# Patient Record
Sex: FEMALE | Race: BLACK OR AFRICAN AMERICAN | NOT HISPANIC OR LATINO
[De-identification: names, ages, dates, MRNs, and addresses within clinical notes are randomized per-mention and may not be internally consistent; named-entity substitution may affect disease eponyms.]

---

## 2017-03-09 ENCOUNTER — APPOINTMENT (OUTPATIENT)
Dept: OTOLARYNGOLOGY | Facility: CLINIC | Age: 67
End: 2017-03-09

## 2017-03-09 VITALS
BODY MASS INDEX: 27 KG/M2 | SYSTOLIC BLOOD PRESSURE: 97 MMHG | DIASTOLIC BLOOD PRESSURE: 65 MMHG | HEIGHT: 61 IN | WEIGHT: 143 LBS | HEART RATE: 52 BPM

## 2017-04-04 ENCOUNTER — APPOINTMENT (OUTPATIENT)
Dept: OPHTHALMOLOGY | Facility: CLINIC | Age: 67
End: 2017-04-04

## 2017-05-27 ENCOUNTER — INPATIENT (INPATIENT)
Facility: HOSPITAL | Age: 67
LOS: 3 days | Discharge: HOME | End: 2017-05-31
Attending: INTERNAL MEDICINE | Admitting: INTERNAL MEDICINE
Payer: MEDICARE

## 2017-05-27 VITALS
RESPIRATION RATE: 20 BRPM | HEART RATE: 124 BPM | DIASTOLIC BLOOD PRESSURE: 81 MMHG | TEMPERATURE: 99 F | SYSTOLIC BLOOD PRESSURE: 121 MMHG | OXYGEN SATURATION: 100 %

## 2017-05-27 LAB
ALBUMIN SERPL ELPH-MCNC: 4 G/DL — SIGNIFICANT CHANGE UP (ref 3.3–5)
ALP SERPL-CCNC: 37 U/L — LOW (ref 40–120)
ALT FLD-CCNC: 16 U/L — SIGNIFICANT CHANGE UP (ref 4–33)
AST SERPL-CCNC: 20 U/L — SIGNIFICANT CHANGE UP (ref 4–32)
BASOPHILS # BLD AUTO: 0.01 K/UL — SIGNIFICANT CHANGE UP (ref 0–0.2)
BASOPHILS NFR BLD AUTO: 0.1 % — SIGNIFICANT CHANGE UP (ref 0–2)
BILIRUB SERPL-MCNC: < 0.2 MG/DL — LOW (ref 0.2–1.2)
BUN SERPL-MCNC: 18 MG/DL — SIGNIFICANT CHANGE UP (ref 7–23)
CALCIUM SERPL-MCNC: 9.6 MG/DL — SIGNIFICANT CHANGE UP (ref 8.4–10.5)
CHLORIDE SERPL-SCNC: 108 MMOL/L — HIGH (ref 98–107)
CK MB BLD-MCNC: 3.61 NG/ML — SIGNIFICANT CHANGE UP (ref 1–4.7)
CK SERPL-CCNC: 255 U/L — HIGH (ref 25–170)
CO2 SERPL-SCNC: 27 MMOL/L — SIGNIFICANT CHANGE UP (ref 22–31)
CREAT SERPL-MCNC: 0.83 MG/DL — SIGNIFICANT CHANGE UP (ref 0.5–1.3)
EOSINOPHIL # BLD AUTO: 0 K/UL — SIGNIFICANT CHANGE UP (ref 0–0.5)
EOSINOPHIL NFR BLD AUTO: 0 % — SIGNIFICANT CHANGE UP (ref 0–6)
GLUCOSE SERPL-MCNC: 123 MG/DL — HIGH (ref 70–99)
HCT VFR BLD CALC: 35.4 % — SIGNIFICANT CHANGE UP (ref 34.5–45)
HGB BLD-MCNC: 11.6 G/DL — SIGNIFICANT CHANGE UP (ref 11.5–15.5)
IMM GRANULOCYTES NFR BLD AUTO: 1.1 % — SIGNIFICANT CHANGE UP (ref 0–1.5)
LYMPHOCYTES # BLD AUTO: 1.62 K/UL — SIGNIFICANT CHANGE UP (ref 1–3.3)
LYMPHOCYTES # BLD AUTO: 17.1 % — SIGNIFICANT CHANGE UP (ref 13–44)
MCHC RBC-ENTMCNC: 24.4 PG — LOW (ref 27–34)
MCHC RBC-ENTMCNC: 32.8 % — SIGNIFICANT CHANGE UP (ref 32–36)
MCV RBC AUTO: 74.4 FL — LOW (ref 80–100)
MONOCYTES # BLD AUTO: 0.54 K/UL — SIGNIFICANT CHANGE UP (ref 0–0.9)
MONOCYTES NFR BLD AUTO: 5.7 % — SIGNIFICANT CHANGE UP (ref 2–14)
NEUTROPHILS # BLD AUTO: 7.21 K/UL — SIGNIFICANT CHANGE UP (ref 1.8–7.4)
NEUTROPHILS NFR BLD AUTO: 76 % — SIGNIFICANT CHANGE UP (ref 43–77)
PLATELET # BLD AUTO: 176 K/UL — SIGNIFICANT CHANGE UP (ref 150–400)
PMV BLD: 12.3 FL — SIGNIFICANT CHANGE UP (ref 7–13)
POTASSIUM SERPL-MCNC: 4.5 MMOL/L — SIGNIFICANT CHANGE UP (ref 3.5–5.3)
POTASSIUM SERPL-SCNC: 4.5 MMOL/L — SIGNIFICANT CHANGE UP (ref 3.5–5.3)
PROT SERPL-MCNC: 7.4 G/DL — SIGNIFICANT CHANGE UP (ref 6–8.3)
RBC # BLD: 4.76 M/UL — SIGNIFICANT CHANGE UP (ref 3.8–5.2)
RBC # FLD: 15.8 % — HIGH (ref 10.3–14.5)
SODIUM SERPL-SCNC: 146 MMOL/L — HIGH (ref 135–145)
TROPONIN T SERPL-MCNC: < 0.06 NG/ML — SIGNIFICANT CHANGE UP (ref 0–0.06)
TSH SERPL-MCNC: 2.29 UIU/ML — SIGNIFICANT CHANGE UP (ref 0.27–4.2)
WBC # BLD: 9.48 K/UL — SIGNIFICANT CHANGE UP (ref 3.8–10.5)
WBC # FLD AUTO: 9.48 K/UL — SIGNIFICANT CHANGE UP (ref 3.8–10.5)

## 2017-05-27 PROCEDURE — 71010: CPT | Mod: 26

## 2017-05-27 RX ORDER — SODIUM CHLORIDE 9 MG/ML
1000 INJECTION INTRAMUSCULAR; INTRAVENOUS; SUBCUTANEOUS ONCE
Qty: 0 | Refills: 0 | Status: COMPLETED | OUTPATIENT
Start: 2017-05-27 | End: 2017-05-27

## 2017-05-27 RX ADMIN — SODIUM CHLORIDE 1000 MILLILITER(S): 9 INJECTION INTRAMUSCULAR; INTRAVENOUS; SUBCUTANEOUS at 21:09

## 2017-05-27 NOTE — ED ADULT NURSE NOTE - OBJECTIVE STATEMENT
Olga RN: Pt received on stretcher in room 3 with visitor at bedside. Pt is awake, ambulatory, A&Ox4, NAD observed, respirations even and unlabored on room air. Pt is 68 YO female with no significant PMH c/o chest pain. Pt states that she had sudden onset of chest pain at 1700h after walking up six flights of stairs, described as "throbbing", located in center of chest, does not radiate, intermittent with palpitations feeling like heart is racing. Denies SOB, HA, any other symptoms. Placed on CM, . SpO2 100% on RA. 20G IV access obtained in left AC, labs drawn and sent as ordered. Comfort and safety measures provided. Report given to primary RN.

## 2017-05-27 NOTE — ED PROVIDER NOTE - OBJECTIVE STATEMENT
67F with borderline DM p/w CP and palpitations since 1700. Reports nonexertional onset, has never had this before. Denies SOB, vomiting, fever, drug use, EtOH use, abd pain, leg swelling.

## 2017-05-27 NOTE — ED PROVIDER NOTE - MEDICAL DECISION MAKING DETAILS
Pt with new onset afib with rvr, symptomatic with CP and palpitations. BP wnl. Will give IVF, cardizem, labs, cxr, reassess, likely admit

## 2017-05-27 NOTE — ED PROVIDER NOTE - ATTENDING CONTRIBUTION TO CARE
I performed a face-to-face evaluation of the patient and performed a history and physical examination. I agree with the history and physical examination.    67-year-old woman with history of palpitations who has a cardiologist Dr. Kishore Colunga presents palpitations without chest pain or shortness of breath. Found to be in atrial fib, which is new-onset. Appears well. Afebrile. Vital signs stable. No lower extremity edema. Respirations unlabored. Will discuss with Dr. Colunga regarding initiation of anticoagulation, as her [id number] score equals two. Evaluate for thyroid disease and MI.

## 2017-05-28 DIAGNOSIS — Z29.9 ENCOUNTER FOR PROPHYLACTIC MEASURES, UNSPECIFIED: ICD-10-CM

## 2017-05-28 DIAGNOSIS — I48.91 UNSPECIFIED ATRIAL FIBRILLATION: ICD-10-CM

## 2017-05-28 DIAGNOSIS — R73.03 PREDIABETES: ICD-10-CM

## 2017-05-28 DIAGNOSIS — Z90.710 ACQUIRED ABSENCE OF BOTH CERVIX AND UTERUS: Chronic | ICD-10-CM

## 2017-05-28 LAB
ALBUMIN SERPL ELPH-MCNC: 3.6 G/DL — SIGNIFICANT CHANGE UP (ref 3.3–5)
ALP SERPL-CCNC: 33 U/L — LOW (ref 40–120)
ALT FLD-CCNC: 17 U/L — SIGNIFICANT CHANGE UP (ref 4–33)
AST SERPL-CCNC: 17 U/L — SIGNIFICANT CHANGE UP (ref 4–32)
BILIRUB SERPL-MCNC: < 0.2 MG/DL — LOW (ref 0.2–1.2)
BUN SERPL-MCNC: 12 MG/DL — SIGNIFICANT CHANGE UP (ref 7–23)
CALCIUM SERPL-MCNC: 8.7 MG/DL — SIGNIFICANT CHANGE UP (ref 8.4–10.5)
CHLORIDE SERPL-SCNC: 109 MMOL/L — HIGH (ref 98–107)
CHOLEST SERPL-MCNC: 172 MG/DL — SIGNIFICANT CHANGE UP (ref 120–199)
CK MB BLD-MCNC: 1.5 — SIGNIFICANT CHANGE UP (ref 0–2.5)
CK MB BLD-MCNC: 3.1 NG/ML — SIGNIFICANT CHANGE UP (ref 1–4.7)
CK SERPL-CCNC: 203 U/L — HIGH (ref 25–170)
CO2 SERPL-SCNC: 26 MMOL/L — SIGNIFICANT CHANGE UP (ref 22–31)
CREAT SERPL-MCNC: 0.65 MG/DL — SIGNIFICANT CHANGE UP (ref 0.5–1.3)
GLUCOSE SERPL-MCNC: 115 MG/DL — HIGH (ref 70–99)
HBA1C BLD-MCNC: 6.2 % — HIGH (ref 4–5.6)
HCT VFR BLD CALC: 34.6 % — SIGNIFICANT CHANGE UP (ref 34.5–45)
HDLC SERPL-MCNC: 68 MG/DL — HIGH (ref 45–65)
HGB BLD-MCNC: 11 G/DL — LOW (ref 11.5–15.5)
LIPID PNL WITH DIRECT LDL SERPL: 104 MG/DL — SIGNIFICANT CHANGE UP
MAGNESIUM SERPL-MCNC: 2 MG/DL — SIGNIFICANT CHANGE UP (ref 1.6–2.6)
MCHC RBC-ENTMCNC: 23.7 PG — LOW (ref 27–34)
MCHC RBC-ENTMCNC: 31.8 % — LOW (ref 32–36)
MCV RBC AUTO: 74.4 FL — LOW (ref 80–100)
PHOSPHATE SERPL-MCNC: 3.2 MG/DL — SIGNIFICANT CHANGE UP (ref 2.5–4.5)
PLATELET # BLD AUTO: 172 K/UL — SIGNIFICANT CHANGE UP (ref 150–400)
PMV BLD: 12 FL — SIGNIFICANT CHANGE UP (ref 7–13)
POTASSIUM SERPL-MCNC: 4 MMOL/L — SIGNIFICANT CHANGE UP (ref 3.5–5.3)
POTASSIUM SERPL-SCNC: 4 MMOL/L — SIGNIFICANT CHANGE UP (ref 3.5–5.3)
PROT SERPL-MCNC: 6.6 G/DL — SIGNIFICANT CHANGE UP (ref 6–8.3)
RBC # BLD: 4.65 M/UL — SIGNIFICANT CHANGE UP (ref 3.8–5.2)
RBC # FLD: 15.8 % — HIGH (ref 10.3–14.5)
SODIUM SERPL-SCNC: 145 MMOL/L — SIGNIFICANT CHANGE UP (ref 135–145)
TRIGL SERPL-MCNC: 35 MG/DL — SIGNIFICANT CHANGE UP (ref 10–149)
TROPONIN T SERPL-MCNC: < 0.06 NG/ML — SIGNIFICANT CHANGE UP (ref 0–0.06)
WBC # BLD: 8.04 K/UL — SIGNIFICANT CHANGE UP (ref 3.8–10.5)
WBC # FLD AUTO: 8.04 K/UL — SIGNIFICANT CHANGE UP (ref 3.8–10.5)

## 2017-05-28 PROCEDURE — 93010 ELECTROCARDIOGRAM REPORT: CPT

## 2017-05-28 RX ORDER — ENOXAPARIN SODIUM 100 MG/ML
65 INJECTION SUBCUTANEOUS EVERY 12 HOURS
Qty: 0 | Refills: 0 | Status: DISCONTINUED | OUTPATIENT
Start: 2017-05-29 | End: 2017-05-30

## 2017-05-28 RX ORDER — ENOXAPARIN SODIUM 100 MG/ML
60 INJECTION SUBCUTANEOUS ONCE
Qty: 0 | Refills: 0 | Status: COMPLETED | OUTPATIENT
Start: 2017-05-28 | End: 2017-05-28

## 2017-05-28 RX ORDER — METOPROLOL TARTRATE 50 MG
25 TABLET ORAL DAILY
Qty: 0 | Refills: 0 | Status: DISCONTINUED | OUTPATIENT
Start: 2017-05-29 | End: 2017-05-29

## 2017-05-28 RX ORDER — SODIUM CHLORIDE 9 MG/ML
1000 INJECTION INTRAMUSCULAR; INTRAVENOUS; SUBCUTANEOUS
Qty: 0 | Refills: 0 | Status: DISCONTINUED | OUTPATIENT
Start: 2017-05-28 | End: 2017-05-29

## 2017-05-28 RX ORDER — BENZOCAINE AND MENTHOL 5; 1 G/100ML; G/100ML
1 LIQUID ORAL EVERY 4 HOURS
Qty: 0 | Refills: 0 | Status: DISCONTINUED | OUTPATIENT
Start: 2017-05-28 | End: 2017-05-31

## 2017-05-28 RX ORDER — METOPROLOL TARTRATE 50 MG
25 TABLET ORAL
Qty: 0 | Refills: 0 | Status: DISCONTINUED | OUTPATIENT
Start: 2017-05-28 | End: 2017-05-28

## 2017-05-28 RX ADMIN — Medication 25 MILLIGRAM(S): at 06:00

## 2017-05-28 RX ADMIN — ENOXAPARIN SODIUM 60 MILLIGRAM(S): 100 INJECTION SUBCUTANEOUS at 01:17

## 2017-05-28 RX ADMIN — SODIUM CHLORIDE 100 MILLILITER(S): 9 INJECTION INTRAMUSCULAR; INTRAVENOUS; SUBCUTANEOUS at 16:03

## 2017-05-28 RX ADMIN — Medication 1 TABLET(S): at 11:48

## 2017-05-28 RX ADMIN — BENZOCAINE AND MENTHOL 1 LOZENGE: 5; 1 LIQUID ORAL at 16:29

## 2017-05-28 NOTE — H&P ADULT - ASSESSMENT
68 y/o F with PMH of borderline DM(diet controlled) presented with the complaint of CP and palpitations. Admitted to telemetry for new onset atrial fibrillation    +New onset atrial fibrillation-On Lovenox 1mg/kg in the ED, on Lopressor 25mg BID for rate control.     PA Addendum: Above patient was discussed with attending(Dr. Marcell Colunga)

## 2017-05-28 NOTE — H&P ADULT - NEGATIVE ENMT SYMPTOMS
no ear pain/no sinus symptoms/no nasal discharge/no hearing difficulty/no nasal congestion/no tinnitus/no vertigo

## 2017-05-28 NOTE — H&P ADULT - GASTROINTESTINAL DETAILS
normal/nontender/no rigidity/no guarding/no rebound tenderness/bowel sounds normal/soft/no distention

## 2017-05-28 NOTE — H&P ADULT - FAMILY HISTORY
Sibling  Still living? Yes, Estimated age: Age Unknown  Family history of colon cancer, Age at diagnosis: Age Unknown  Family history of hypertension, Age at diagnosis: Age Unknown

## 2017-05-28 NOTE — H&P ADULT - NEGATIVE GASTROINTESTINAL SYMPTOMS
no diarrhea/no change in bowel habits/no abdominal pain/no constipation/no nausea/no vomiting/no melena/no hematochezia

## 2017-05-28 NOTE — H&P ADULT - NEGATIVE MUSCULOSKELETAL SYMPTOMS
no arthritis/no stiffness/no neck pain/no muscle weakness/no myalgia/no muscle cramps/no joint swelling/no arthralgia

## 2017-05-28 NOTE — H&P ADULT - NEGATIVE OPHTHALMOLOGIC SYMPTOMS
no photophobia/no blurred vision R/no discharge R/no discharge L/no diplopia/no lacrimation R/no lacrimation L/no blurred vision L

## 2017-05-28 NOTE — CHART NOTE - NSCHARTNOTEFT_GEN_A_CORE
66 y/o female c/o a little dizziness and low BP.  /64-> 96/57  Will hold discharge for today. give some ivf and monitor tele overnight.

## 2017-05-28 NOTE — H&P ADULT - RS GEN PE MLT RESP DETAILS PC
breath sounds equal/normal/no intercostal retractions/respirations non-labored/no rales/no wheezes/no chest wall tenderness/good air movement/airway patent/clear to auscultation bilaterally/no rhonchi

## 2017-05-28 NOTE — H&P ADULT - HISTORY OF PRESENT ILLNESS
68 y/o F with PMH of borderline DM(diet controlled) presented with the complaint of CP and palpitations. As per the patient she was outside running errands and came home and developed sudden onset palpitations. Patient stated that she had a similar episode about 7 years ago and it self resolved. Patient stated that the palpitations was constant this time and she decided to come to the ER. Patient checked her blood pressure and HR and noticed that it was 145 and BP was 116/87. At that same time she developed midsternal chest pain, lasting only 3 minutes in duration, characterized as a pressure like sensation, without any aggravating or alleviating factors, without any radiation of the chest pain, with a severity of 5/10. Patient denied any SOB with the palpitations. Patient denied any caffeine use or any new stressors. Patient stated that she had a TTE in December 2016 and was told that "it was normal". Patient denied any fevers, chills, N/V/D/C, headaches, abdominal pain, cough, melena, hematochezia, recent travel, sick contact, cough, pleuritic or positional chest pain.     On ED admission EKG revealed CE x 1: Trop: Negative, CK: 255, Gluc: 123. Prelim CXR: No urgent/emergent findings. Patient received 1x dose of Lovenox 1mg/kg in the ED for anticoagulation. Patient also received Cardizem 10mg IVP and Cardizem 30mg PO in the ED. When examined patient is resting in the stretcher and endorsed mild palpitations, denied any other complaints such as CP or SOB. 66 y/o F with PMH of borderline DM(diet controlled) presented with the complaint of CP and palpitations. As per the patient she was outside running errands and came home and developed sudden onset palpitations. Patient stated that she had a similar episode about 7 years ago and it self resolved. Patient stated that the palpitations was constant this time and she decided to come to the ER. Patient checked her blood pressure and HR and noticed that it was 145 and BP was 116/87. At that same time she developed midsternal chest pain, lasting only 3 minutes in duration, characterized as a pressure like sensation, without any aggravating or alleviating factors, without any radiation of the chest pain, with a severity of 5/10. Patient denied any SOB with the palpitations. Patient denied any caffeine use or any new stressors. Patient stated that she had a TTE in December 2016 and was told that "it was normal". Patient denied any fevers, chills, N/V/D/C, headaches, abdominal pain, cough, melena, hematochezia, recent travel, sick contact, cough, pleuritic or positional chest pain.     On ED admission EKG Atrial fibrillation with RVR at a rate of 104 with early repolarization V5-V6 with Qtc of 357, CE x 1: Trop: Negative, CK: 255, Gluc: 123. Prelim CXR: No urgent/emergent findings. Patient received 1x dose of Lovenox 1mg/kg in the ED for anticoagulation. Patient also received Cardizem 10mg IVP and Cardizem 30mg PO in the ED. When examined patient is resting in the stretcher and endorsed mild palpitations, denied any other complaints such as CP or SOB.

## 2017-05-28 NOTE — H&P ADULT - PROBLEM SELECTOR PLAN 1
[id number] for 2 and patient received 1mg/kg of Lovenox in the ED. Continue with Lovenox 65mg BID for anticoagulation  Will admit to telemetry, serial EKG, serial Shellie prn chest pain/palpitations  f/u MD note   HgbA1C, lipid profile, CBC, CMP in am   TTE ordered to evaluate LVEF  Started patient on Lopressor 25mg BID for rate control

## 2017-05-28 NOTE — H&P ADULT - NEGATIVE NEUROLOGICAL SYMPTOMS
no syncope/no paresthesias/no tremors/no headache/no focal seizures/no generalized seizures/no weakness/no confusion/no difficulty walking/no loss of consciousness/no hemiparesis/no vertigo/no transient paralysis/no loss of sensation

## 2017-05-29 LAB
BUN SERPL-MCNC: 17 MG/DL — SIGNIFICANT CHANGE UP (ref 7–23)
CALCIUM SERPL-MCNC: 9 MG/DL — SIGNIFICANT CHANGE UP (ref 8.4–10.5)
CHLORIDE SERPL-SCNC: 105 MMOL/L — SIGNIFICANT CHANGE UP (ref 98–107)
CO2 SERPL-SCNC: 25 MMOL/L — SIGNIFICANT CHANGE UP (ref 22–31)
CREAT SERPL-MCNC: 0.65 MG/DL — SIGNIFICANT CHANGE UP (ref 0.5–1.3)
GLUCOSE SERPL-MCNC: 100 MG/DL — HIGH (ref 70–99)
HCT VFR BLD CALC: 34.7 % — SIGNIFICANT CHANGE UP (ref 34.5–45)
HGB BLD-MCNC: 11.1 G/DL — LOW (ref 11.5–15.5)
MAGNESIUM SERPL-MCNC: 2 MG/DL — SIGNIFICANT CHANGE UP (ref 1.6–2.6)
MCHC RBC-ENTMCNC: 23.9 PG — LOW (ref 27–34)
MCHC RBC-ENTMCNC: 32 % — SIGNIFICANT CHANGE UP (ref 32–36)
MCV RBC AUTO: 74.6 FL — LOW (ref 80–100)
PLATELET # BLD AUTO: 163 K/UL — SIGNIFICANT CHANGE UP (ref 150–400)
PMV BLD: 12.2 FL — SIGNIFICANT CHANGE UP (ref 7–13)
POTASSIUM SERPL-MCNC: 3.8 MMOL/L — SIGNIFICANT CHANGE UP (ref 3.5–5.3)
POTASSIUM SERPL-SCNC: 3.8 MMOL/L — SIGNIFICANT CHANGE UP (ref 3.5–5.3)
RBC # BLD: 4.65 M/UL — SIGNIFICANT CHANGE UP (ref 3.8–5.2)
RBC # FLD: 15.8 % — HIGH (ref 10.3–14.5)
SODIUM SERPL-SCNC: 143 MMOL/L — SIGNIFICANT CHANGE UP (ref 135–145)
WBC # BLD: 8.35 K/UL — SIGNIFICANT CHANGE UP (ref 3.8–10.5)
WBC # FLD AUTO: 8.35 K/UL — SIGNIFICANT CHANGE UP (ref 3.8–10.5)

## 2017-05-29 PROCEDURE — 93010 ELECTROCARDIOGRAM REPORT: CPT

## 2017-05-29 RX ORDER — SOTALOL HCL 120 MG
80 TABLET ORAL
Qty: 0 | Refills: 0 | Status: DISCONTINUED | OUTPATIENT
Start: 2017-05-29 | End: 2017-05-31

## 2017-05-29 RX ORDER — METOPROLOL TARTRATE 50 MG
12.5 TABLET ORAL DAILY
Qty: 0 | Refills: 0 | Status: DISCONTINUED | OUTPATIENT
Start: 2017-05-29 | End: 2017-05-29

## 2017-05-29 RX ADMIN — BENZOCAINE AND MENTHOL 1 LOZENGE: 5; 1 LIQUID ORAL at 20:51

## 2017-05-29 RX ADMIN — Medication 1 TABLET(S): at 13:44

## 2017-05-29 RX ADMIN — Medication 12.5 MILLIGRAM(S): at 10:02

## 2017-05-29 RX ADMIN — Medication 80 MILLIGRAM(S): at 20:28

## 2017-05-29 RX ADMIN — BENZOCAINE AND MENTHOL 1 LOZENGE: 5; 1 LIQUID ORAL at 06:20

## 2017-05-29 RX ADMIN — ENOXAPARIN SODIUM 65 MILLIGRAM(S): 100 INJECTION SUBCUTANEOUS at 13:44

## 2017-05-29 NOTE — CONSULT NOTE ADULT - SUBJECTIVE AND OBJECTIVE BOX
HISTORY OF PRESENT ILLNESS:    68 y/o F with PMH of borderline DM(diet controlled)   presented with the complaint of CP and palpitations.   As per the patient she was outside running errands and came home and developed sudden onset palpitations. Patient stated that she had a similar episode about 7 years ago and it self resolved. Patient stated that the palpitations was constant this time and she decided to come to the ER. Patient checked her blood pressure and HR and noticed that it was 145 and BP was 116/87. At that same time she developed midsternal chest pain, lasting only 3 minutes in duration, characterized as a pressure like sensation, without any aggravating or alleviating factors, without any radiation of the chest pain, with a severity of 5/10.   Patient denied any SOB with the palpitations. Patient denied any caffeine use or any new stressors.  Patient denied any fevers, chills, N/V/D/C, headaches, abdominal pain, cough, melena, hematochezia, recent travel, sick contact, cough, pleuritic or positional chest pain.   Patient stated that she had a TTE in December 2016 and was told that "it was normal".    On ED admission EKG Atrial fibrillation with RVR at a rate of 104 with early repolarization V5-V6 with Qtc of 357, CE x 1: Trop: Negative, CK: 255, Gluc: 123.  Patient received 1x dose of Lovenox 1mg/kg in the ED for anticoagulation. Patient also received Cardizem 10mg IVP and Cardizem 30mg PO in the ED. When examined patient is resting in the stretcher and endorsed mild palpitations, denied any other complaints such as CP or SOB.       PAST MEDICAL & SURGICAL HISTORY:  Borderline diabetes  H/O: hysterectomy    	    MEDICATIONS:  enoxaparin Injectable 65milliGRAM(s) SubCutaneous every 12 hours  metoprolol succinate ER 12.5milliGRAM(s) Oral daily              multivitamin 1Tablet(s) Oral daily  sodium chloride 0.9%. 1000milliLiter(s) IV Continuous <Continuous>  benzocaine 15 mG/menthol 3.6 mG Lozenge 1Lozenge Oral every 4 hours PRN      Allergies    No Known Allergies    Intolerances        FAMILY HISTORY:  Family history of hypertension (Sibling)  Family history of colon cancer (Sibling)      SOCIAL HISTORY:    [x ] Non-smoker  [ ] Smoker  [ ] Alcohol      REVIEW OF SYSTEMS:        CONSTITUTIONAL: No fever, weight loss, or fatigue  EYES: No eye pain, visual disturbances, or discharge  ENMT:  No difficulty hearing, tinnitus, vertigo; No sinus or throat pain  NECK: No pain or stiffness  RESPIRATORY: No cough, wheezing, chills or hemoptysis; No Shortness of Breath  CARDIOVASCULAR: No chest pain, palpitations, passing out, dizziness, or leg swelling  GASTROINTESTINAL: No abdominal or epigastric pain. No nausea, vomiting, or hematemesis; No diarrhea or constipation. No melena or hematochezia.  GENITOURINARY: No dysuria, frequency, hematuria, or incontinence  NEUROLOGICAL: No headaches, memory loss, loss of strength, numbness, or tremors  SKIN: No itching, burning, rashes, or lesions   LYMPH Nodes: No enlarged glands  ENDOCRINE: No heat or cold intolerance; No hair loss  MUSCULOSKELETAL: No joint pain or swelling; No muscle, back, or extremity pain  PSYCHIATRIC: No depression, anxiety, mood swings, or difficulty sleeping  HEME/LYMPH: No easy bruising, or bleeding gums  ALLERY AND IMMUNOLOGIC: No hives or eczema	    [ ] All others negative	  [ ] Unable to obtain    PHYSICAL EXAM:  T(C): 37.3, Max: 37.3 (05-29 @ 12:52)  HR: 56 (44 - 65)  BP: 116/71 (88/53 - 116/71)  RR: 18 (18 - 18)  SpO2: 100% (99% - 100%)  Wt(kg): --  I&O's Summary      Appearance: Normal	  HEENT:   Normal oral mucosa, PERRL, EOMI	  Lymphatic: No lymphadenopathy  Cardiovascular: Normal S1 S2, No JVD, No murmurs, No edema  Respiratory: Lungs clear to auscultation	  Psychiatry: A & O x 3, Mood & affect appropriate  Gastrointestinal:  Soft, Non-tender, + BS	  Skin: No rashes, No ecchymoses, No cyanosis	  Neurologic: Non-focal  Extremities: Normal range of motion, No clubbing, cyanosis or edema  Vascular: Peripheral pulses palpable 2+ bilaterally    TELEMETRY:   	    ECG:  	    RADIOLOGY:    OTHER: 	  	  LABS:	 	    CARDIAC MARKERS:                                  11.1   8.35  )-----------( 163      ( 29 May 2017 07:01 )             34.7       05-29    143  |  105  |  17  ----------------------------<  100<H>  3.8   |  25  |  0.65    Ca    9.0      29 May 2017 07:00  Phos  3.2     05-28  Mg     2.0     05-29    TPro  6.6  /  Alb  3.6  /  TBili  < 0.2<L>  /  DBili  x   /  AST  17  /  ALT  17  /  AlkPhos  33<L>  05-28      proBNP:     Lipid Profile:     HgA1c:     TSH:     ASSESSMENT/PLAN: HISTORY OF PRESENT ILLNESS:    66 y/o F with PMH of borderline DM(diet controlled)   presented with the complaint of CP and palpitations.   As per the patient she was outside running errands and came home and developed sudden onset palpitations. Patient stated that she had a similar episode about 7 years ago and it self resolved. Patient stated that the palpitations was constant this time and she decided to come to the ER. Patient checked her blood pressure and HR and noticed that it was 145 and BP was 116/87. At that same time she developed midsternal chest pain, lasting only 3 minutes in duration, characterized as a pressure like sensation, without any aggravating or alleviating factors, without any radiation of the chest pain, with a severity of 5/10.   Patient denied any SOB with the palpitations. Patient denied any caffeine use or any new stressors.  Patient denied any fevers, chills, N/V/D/C, headaches, abdominal pain, cough, melena, hematochezia, recent travel, sick contact, cough, pleuritic or positional chest pain.   Patient stated that she had a TTE in December 2016 and was told that "it was normal".    On ED admission EKG Atrial fibrillation with RVR at a rate of 104 with early repolarization V5-V6 with Qtc of 357, CE x 1: Trop: Negative, CK: 255, Gluc: 123.  Patient received 1x dose of Lovenox 1mg/kg in the ED for anticoagulation. Patient also received Cardizem 10mg IVP and Cardizem 30mg PO in the ED. When examined patient is resting in the stretcher and endorsed mild palpitations, denied any other complaints such as CP or SOB.       PAST MEDICAL & SURGICAL HISTORY:  Borderline diabetes  H/O: hysterectomy    	    MEDICATIONS:  enoxaparin Injectable 65milliGRAM(s) SubCutaneous every 12 hours  metoprolol succinate ER 12.5milliGRAM(s) Oral daily              multivitamin 1Tablet(s) Oral daily  sodium chloride 0.9%. 1000milliLiter(s) IV Continuous <Continuous>  benzocaine 15 mG/menthol 3.6 mG Lozenge 1Lozenge Oral every 4 hours PRN      Allergies    No Known Allergies    Intolerances        FAMILY HISTORY:  Family history of hypertension (Sibling)  Family history of colon cancer (Sibling)  no known cardiac hx w/ mother or father       SOCIAL HISTORY:    [x ] Non-smoker  [ ] Smoker  [ ] Alcohol      REVIEW OF SYSTEMS:        CONSTITUTIONAL: No fever, weight loss, or fatigue  EYES: No eye pain, visual disturbances, or discharge  ENMT:  No difficulty hearing, tinnitus, vertigo; No sinus or throat pain  NECK: No pain or stiffness  RESPIRATORY: No cough, wheezing, chills or hemoptysis; No Shortness of Breath  CARDIOVASCULAR: No syncope dizziness, or leg swelling  intermittent palpations chest pain at admission   GASTROINTESTINAL: No abdominal or epigastric pain. No nausea, vomiting, or hematemesis; No diarrhea or constipation. No melena or hematochezia.  GENITOURINARY: No dysuria, frequency, hematuria, or incontinence  NEUROLOGICAL: No headaches, memory loss, loss of strength, numbness, or tremors  SKIN: No itching, burning, rashes, or lesions   LYMPH Nodes: No enlarged glands  ENDOCRINE: No heat or cold intolerance; No hair loss  MUSCULOSKELETAL: No joint pain or swelling; No muscle, back, or extremity pain  PSYCHIATRIC: No depression, anxiety, mood swings, or difficulty sleeping  HEME/LYMPH: No easy bruising, or bleeding gums  ALLERY AND IMMUNOLOGIC: No hives or eczema	    [ ] All others negative	  [ ] Unable to obtain    PHYSICAL EXAM:  T(C): 37.3, Max: 37.3 (05-29 @ 12:52)  HR: 56 (44 - 65)  BP: 116/71 (88/53 - 116/71)  RR: 18 (18 - 18)  SpO2: 100% (99% - 100%)  Wt(kg): --  I&O's Summary      Appearance: Normal	  HEENT:   Normal oral mucosa, PERRL, conjunctiva clear  Lymphatic: No lymphadenopathy  Cardiovascular: Normal S1 S2 RRR, No JVD, No murmurs, No edema  Respiratory: Lungs clear to auscultation	  Psychiatry: A & O x 3, Mood & affect appropriate  Gastrointestinal:  Soft, Non-tender, + BS	  Skin: No rashes, No ecchymoses, No cyanosis	  Neurologic: Non-focal  Extremities: AROM WFL's, No clubbing, cyanosis or edema  Vascular: Peripheral pulses palpable 2+ bilaterally    TELEMETRY:   currently NSR   has had  some AFIB   	  ECG:  	    RADIOLOGY:  CXR  There are no focal consolidations. The lungs are clear. There is no   evidence of pneumothorax or pleural effusion. The cardiomediastinal   silhouette is stable in size.         OTHER:     ECHO 2014 	  EF 71%  Normal mitral valve. Mild mitral regurgitation.. Normal left ventricular systolic function. No segmental  wall motion abnormalities.   Normal right ventricular size and function.   normal pulmonary pressures.  . Normal tricuspid valve. Mild tricuspid regurgitation.      	  LABS:	   CE  trop neg x2 	  ck 255-->203    CARDIAC MARKERS:                                  11.1   8.35  )-----------( 163      ( 29 May 2017 07:01 )             34.7       05-29    143  |  105  |  17  ----------------------------<  100<H>  3.8   |  25  |  0.65    Ca    9.0      29 May 2017 07:00  Phos  3.2     05-28  Mg     2.0     05-29    TPro  6.6  /  Alb  3.6  /  TBili  < 0.2<L>  /  DBili  x   /  AST  17  /  ALT  17  /  AlkPhos  33<L>  05-28      proBNP:     Lipid Profile: Lipid Profile (05.28.17 @ 03:10)    Cholesterol, Serum: 172 mg/dL    Triglycerides, Serum: 35 mg/dL    HDL Cholesterol, Serum: 68 mg/dL    Direct LDL: 104:   RESULT (mg/dL)   (For adults 18 years and older)  ----------------------------------------------------------  Below 70         Ideal for people at very high risk of  heart disease  Below 100        Ideal for people at risk of heart disease  100 - [address]24: 9        [place]  130 - 159        Borderline high  160 - 189        High  190 and above    Very high mg/dL        HgA1c: 6.2    TSH: Thyroid Stimulating Hormone, Serum (05.27.17 @ 20:30)    Thyroid Stimulating Hormone, Serum: 2.29 uIU/mL        ASSESSMENT/PLAN: 	66 y/o F with PMH of borderline DM(diet controlled)   presented with the complaint of CP and palpitations.     A fib   Chads Vasc2 Score of 3 ( age/ sex/ DM)   a/c recommended for cva prevention if no contraindications   currently on Lovenox bid   currently on Toprol xl 12.5    she has had  bradycardia 40's during sleep asymptomatic   will continue to monitor on tele   echo pending   further recommendations based on the above. HISTORY OF PRESENT ILLNESS:    68 y/o F with PMH of borderline DM(diet controlled) presented with the complaint of CP and palpitations. Found to have new PAF which has since terminated to NSR. As per the patient she was outside running errands and came home and developed sudden onset palpitations. Patient stated that she had a similar episode about 7 years ago and it self resolved. Patient stated that the palpitations was constant this time and she decided to come to the ER. Patient checked her blood pressure and HR and noticed that it was 145 and BP was 116/87. At that same time she developed midsternal chest pain, lasting only 3 minutes in duration, characterized as a pressure like sensation, without any aggravating or alleviating factors, without any radiation of the chest pain, with a severity of 5/10. Patient denied any SOB with the palpitations. Patient denied any caffeine use or any new stressors.  Patient denied any fevers, chills, N/V/D/C, headaches, abdominal pain, cough, melena, hematochezia, recent travel, sick contact, cough, pleuritic or positional chest pain.   Patient stated that she had a TTE in December 2016 and was told that "it was normal".      PAST MEDICAL & SURGICAL HISTORY:  Borderline diabetes  PAF  H/O: hysterectomy    	  MEDICATIONS:  enoxaparin Injectable 65milliGRAM(s) SubCutaneous every 12 hours  metoprolol succinate ER 12.5milliGRAM(s) Oral daily  multivitamin 1Tablet(s) Oral daily  sodium chloride 0.9%. 1000milliLiter(s) IV Continuous <Continuous>  benzocaine 15 mG/menthol 3.6 mG Lozenge 1Lozenge Oral every 4 hours PRN    No Known Allergies      FAMILY HISTORY:  Family history of hypertension (Sibling)  Family history of colon cancer (Sibling)  no known cardiac hx w/ mother or father       SOCIAL HISTORY:    [x ] Non-smoker  [ ] Smoker  [ ] Alcohol      REVIEW OF SYSTEMS:        CONSTITUTIONAL: No fever, weight loss, or fatigue  EYES: No eye pain, visual disturbances, or discharge  ENMT:  No difficulty hearing, tinnitus, vertigo; No sinus or throat pain  NECK: No pain or stiffness  RESPIRATORY: No cough, wheezing, chills or hemoptysis; No Shortness of Breath  CARDIOVASCULAR: No syncope dizziness, or leg swelling  intermittent palpations chest pain at admission   GASTROINTESTINAL: No abdominal or epigastric pain. No nausea, vomiting, or hematemesis; No diarrhea or constipation. No melena or hematochezia.  GENITOURINARY: No dysuria, frequency, hematuria, or incontinence  NEUROLOGICAL: No headaches, memory loss, loss of strength, numbness, or tremors  SKIN: No itching, burning, rashes, or lesions   LYMPH Nodes: No enlarged glands  ENDOCRINE: No heat or cold intolerance; No hair loss  MUSCULOSKELETAL: No joint pain or swelling; No muscle, back, or extremity pain  PSYCHIATRIC: No depression, anxiety, mood swings, or difficulty sleeping  HEME/LYMPH: No easy bruising, or bleeding gums  ALLERY AND IMMUNOLOGIC: No hives or eczema	    [ ] All others negative	  [ ] Unable to obtain    PHYSICAL EXAM:  T(C): 37.3, Max: 37.3 (05-29 @ 12:52)  HR: 56 (44 - 65)  BP: 116/71 (88/53 - 116/71)  RR: 18 (18 - 18)  SpO2: 100% (99% - 100%)  Wt(kg): --  I&O's Summary      Appearance: Normal	  HEENT:   Normal oral mucosa, PERRL, conjunctiva clear  Lymphatic: No lymphadenopathy  Cardiovascular: Normal S1 S2 RRR, No JVD, No murmurs, No edema  Respiratory: Lungs clear to auscultation	  Psychiatry: A & O x 3, Mood & affect appropriate  Gastrointestinal:  Soft, Non-tender, + BS	  Skin: No rashes, No ecchymoses, No cyanosis	  Neurologic: Non-focal  Extremities: AROM WFL's, No clubbing, cyanosis or edema  Vascular: Peripheral pulses palpable 2+ bilaterally    TELEMETRY:   currently NSR   has had  some AFIB   	  ECG: NSR,  ms 	    RADIOLOGY:  CXR  There are no focal consolidations. The lungs are clear. There is no   evidence of pneumothorax or pleural effusion. The cardiomediastinal   silhouette is stable in size.      ECHO 2014 	  EF 71%  Normal mitral valve. Mild mitral regurgitation.. Normal left ventricular systolic function. No segmental  wall motion abnormalities.   Normal right ventricular size and function.   normal pulmonary pressures.  . Normal tricuspid valve. Mild tricuspid regurgitation.      	  LABS:	   CE  trop neg x2 	  ck 255-->203    CARDIAC MARKERS:                                  11.1   8.35  )-----------( 163      ( 29 May 2017 07:01 )             34.7       05-29    143  |  105  |  17  ----------------------------<  100<H>  3.8   |  25  |  0.65    Ca    9.0      29 May 2017 07:00  Phos  3.2     05-28  Mg     2.0     05-29    TPro  6.6  /  Alb  3.6  /  TBili  < 0.2<L>  /  DBili  x   /  AST  17  /  ALT  17  /  AlkPhos  33<L>  05-28      proBNP:     Lipid Profile: Lipid Profile (05.28.17 @ 03:10)    Cholesterol, Serum: 172 mg/dL    Triglycerides, Serum: 35 mg/dL    HDL Cholesterol, Serum: 68 mg/dL    Direct LDL: 104:   RESULT (mg/dL)   (For adults 18 years and older)  ----------------------------------------------------------  Below 70         Ideal for people at very high risk of  heart disease  Below 100        Ideal for people at risk of heart disease  100 - [address]: 9        [place]  130 - 159        Borderline high  160 - 189        High  190 and above    Very high mg/dL        HgA1c: 6.2    TSH: Thyroid Stimulating Hormone, Serum (05.27.17 @ 20:30)    Thyroid Stimulating Hormone, Serum: 2.29 uIU/mL        ASSESSMENT/PLAN: 	68 y/o F with PMH of borderline DM(diet controlled) presented with symptomatic PAF.

## 2017-05-29 NOTE — CONSULT NOTE ADULT - ATTENDING COMMENTS
EP ATTENDING    Patient seen and examined. Agree with above. Admitted with symptomatic new PAF which has since spontaneously terminated to NSR/sinus bradycardia. TSH normal. Echo pending (normal last year). QTc in  ms. CHADS-VASC 2-3.    -agree with lifelong A/C. Start whichever NOAC is covered by her insurance  -d/c metoprolol, start sotalol 80mg bid, check qtc 2 hours after every dose of sotalol, continue as long as QTC < 500 ms  -d/c home when repeat echo unremarkable (can be done as outpatient if needed) and QTc stays < 500 after 3 doses of sotalol minimum  -excellent candidate for outpatient Afib ablation if repeat echo normal    Osito Powers M.D., [facility]  [phone]

## 2017-05-30 ENCOUNTER — TRANSCRIPTION ENCOUNTER (OUTPATIENT)
Age: 67
End: 2017-05-30

## 2017-05-30 LAB
BUN SERPL-MCNC: 17 MG/DL — SIGNIFICANT CHANGE UP (ref 7–23)
CALCIUM SERPL-MCNC: 9.3 MG/DL — SIGNIFICANT CHANGE UP (ref 8.4–10.5)
CHLORIDE SERPL-SCNC: 105 MMOL/L — SIGNIFICANT CHANGE UP (ref 98–107)
CO2 SERPL-SCNC: 24 MMOL/L — SIGNIFICANT CHANGE UP (ref 22–31)
CREAT SERPL-MCNC: 0.7 MG/DL — SIGNIFICANT CHANGE UP (ref 0.5–1.3)
GLUCOSE SERPL-MCNC: 89 MG/DL — SIGNIFICANT CHANGE UP (ref 70–99)
HCT VFR BLD CALC: 35.4 % — SIGNIFICANT CHANGE UP (ref 34.5–45)
HGB BLD-MCNC: 11.6 G/DL — SIGNIFICANT CHANGE UP (ref 11.5–15.5)
MCHC RBC-ENTMCNC: 24 PG — LOW (ref 27–34)
MCHC RBC-ENTMCNC: 32.8 % — SIGNIFICANT CHANGE UP (ref 32–36)
MCV RBC AUTO: 73.1 FL — LOW (ref 80–100)
PLATELET # BLD AUTO: 180 K/UL — SIGNIFICANT CHANGE UP (ref 150–400)
PMV BLD: SIGNIFICANT CHANGE UP FL (ref 7–13)
POTASSIUM SERPL-MCNC: 4 MMOL/L — SIGNIFICANT CHANGE UP (ref 3.5–5.3)
POTASSIUM SERPL-SCNC: 4 MMOL/L — SIGNIFICANT CHANGE UP (ref 3.5–5.3)
RBC # BLD: 4.84 M/UL — SIGNIFICANT CHANGE UP (ref 3.8–5.2)
RBC # FLD: 15.4 % — HIGH (ref 10.3–14.5)
SODIUM SERPL-SCNC: 144 MMOL/L — SIGNIFICANT CHANGE UP (ref 135–145)
WBC # BLD: 8.3 K/UL — SIGNIFICANT CHANGE UP (ref 3.8–10.5)
WBC # FLD AUTO: 8.3 K/UL — SIGNIFICANT CHANGE UP (ref 3.8–10.5)

## 2017-05-30 PROCEDURE — 93306 TTE W/DOPPLER COMPLETE: CPT | Mod: 26

## 2017-05-30 PROCEDURE — 93010 ELECTROCARDIOGRAM REPORT: CPT

## 2017-05-30 RX ORDER — DOCUSATE SODIUM 100 MG
100 CAPSULE ORAL THREE TIMES A DAY
Qty: 0 | Refills: 0 | Status: DISCONTINUED | OUTPATIENT
Start: 2017-05-30 | End: 2017-05-31

## 2017-05-30 RX ORDER — APIXABAN 2.5 MG/1
5 TABLET, FILM COATED ORAL
Qty: 0 | Refills: 0 | Status: DISCONTINUED | OUTPATIENT
Start: 2017-05-30 | End: 2017-05-31

## 2017-05-30 RX ORDER — APIXABAN 2.5 MG/1
1 TABLET, FILM COATED ORAL
Qty: 60 | Refills: 0 | OUTPATIENT
Start: 2017-05-30 | End: 2017-06-29

## 2017-05-30 RX ORDER — RIVAROXABAN 15 MG-20MG
1 KIT ORAL
Qty: 30 | Refills: 0 | OUTPATIENT
Start: 2017-05-30 | End: 2017-06-29

## 2017-05-30 RX ORDER — DABIGATRAN ETEXILATE MESYLATE 150 MG/1
1 CAPSULE ORAL
Qty: 60 | Refills: 0 | OUTPATIENT
Start: 2017-05-30 | End: 2017-06-29

## 2017-05-30 RX ADMIN — APIXABAN 5 MILLIGRAM(S): 2.5 TABLET, FILM COATED ORAL at 20:11

## 2017-05-30 RX ADMIN — ENOXAPARIN SODIUM 65 MILLIGRAM(S): 100 INJECTION SUBCUTANEOUS at 00:39

## 2017-05-30 RX ADMIN — Medication 100 MILLIGRAM(S): at 13:18

## 2017-05-30 RX ADMIN — Medication 80 MILLIGRAM(S): at 20:38

## 2017-05-30 RX ADMIN — ENOXAPARIN SODIUM 65 MILLIGRAM(S): 100 INJECTION SUBCUTANEOUS at 11:35

## 2017-05-30 RX ADMIN — Medication 100 MILLIGRAM(S): at 06:27

## 2017-05-30 RX ADMIN — Medication 80 MILLIGRAM(S): at 06:27

## 2017-05-30 RX ADMIN — Medication 100 MILLIGRAM(S): at 21:13

## 2017-05-30 RX ADMIN — Medication 1 TABLET(S): at 11:35

## 2017-05-30 NOTE — PROGRESS NOTE ADULT - SUBJECTIVE AND OBJECTIVE BOX
Subjective: asymptomatic sinus ashley this AM . Patient with no anginal chest pain or shortness of breath  No dizziness or lightheadedness or syncope  No palpitations    	  MEDICATIONS:  MEDICATIONS  (STANDING):  multivitamin 1Tablet(s) Oral daily  enoxaparin Injectable 65milliGRAM(s) SubCutaneous every 12 hours  sotalol 80milliGRAM(s) Oral two times a day  docusate sodium 100milliGRAM(s) Oral three times a day    MEDICATIONS  (PRN):  benzocaine 15 mG/menthol 3.6 mG Lozenge 1Lozenge Oral every 4 hours PRN Sore Throat      LABS:	 	    CARDIAC MARKERS:  CARDIAC MARKERS ( 28 May 2017 03:10 )  x     / < 0.06 ng/mL / 203 u/L / 3.10 ng/mL / x      CARDIAC MARKERS ( 27 May 2017 20:30 )  x     / < 0.06 ng/mL / 255 u/L / 3.61 ng/mL / x                                    11.6   8.30  )-----------( 180      ( 30 May 2017 05:57 )             35.4     05-30    144  |  105  |  17  ----------------------------<  89  4.0   |  24  |  0.70    Ca    9.3      30 May 2017 05:57  Mg     2.0     05-29      COAGS:       proBNP:   Lipid Profile:   HgA1c:   TSH:       PHYSICAL EXAM:  T(C): 36.7, Max: 36.9 (05-30 @ 06:23)  HR: 47 (43 - 47)  BP: 107/70 (99/62 - 107/70)  RR: 18 (18 - 18)  SpO2: 100% (98% - 100%)  Wt(kg): --  I&O's Summary        HEENT:   Normal oral mucosa, PERRL, EOMI	  Lymphatic: No obvious lymphadenopathy , no edema  Cardiovascular: Normal S1 S2, No JVD, 1/6 CAROLE murmur, Peripheral pulses palpable 2+ bilaterally  Respiratory: Lungs clear to auscultation, normal effort 	  Gastrointestinal:  Soft, Non-tender, + BS	  Skin: No rashes,  No cyanosis, warm to touch  Musculoskeletal: Normal range of motion, normal strength  Psychiatry:  Appropriate Mood & affect     TELEMETRY: 	  sinus ashley 50s (in the AM in the 30s)   ECG:  	afib with RVR 130s narrow QRS no ischemia   RADIOLOGY:     DIAGNOSTIC TESTING:  [ ] Echocardiogram:   CONCLUSIONS:  1. Normal mitral valve. Mild mitral regurgitation.  2. Normal left ventricular systolic function. No segmental  wall motion abnormalities.  3. Normal right ventricular size and function.  4. Estimated right ventricular systolic pressure equals 30  mm Hg, assuming right atrial pressure equals 10 mm Hg,  consistent with normal pulmonary pressures.  5. Normal tricuspid valve. Mild tricuspid regurgitation.  *** No previous Echo exam.  ------------------------------------------------------------------------  Confirmed on  3/5/2014      [ ]  Catheterization: none    [ ] Stress Test:  none  OTHER: 	      ASSESSMENT/PLAN: 	67y Female h/o borderline DM , no h/o CAD /MI with historically normal LV function on TTE 2014, a/w symptomatic new PAF which has since spontaneously terminated to NSR/sinus bradycardia with normal TSH normal.with CHADS-VASC 2-3.    -agree with lifelong A/C. Start whichever NOAC is covered by her insurance- for now c/w therapeutic Lovenox  - metoprolol dced - start sotalol 80mg bid initiated (has received 2 doses thus far ) , check qtc 2 hours after every dose of sotalol, continue as long as QTC < 500 ms (thus far QTC has been < 500 - follow up EKG s/p evening dose Sotalol tonight)   - asymptomatic sinus ashley this AM - c/w Sotalol for now  - f/u TTE results   -excellent candidate for outpatient Afib ablation if repeat echo normal Subjective: asymptomatic sinus ashley this AM . Patient with no anginal chest pain or shortness of breath  No dizziness or lightheadedness or syncope  No palpitations    	  MEDICATIONS:  MEDICATIONS  (STANDING):  multivitamin 1Tablet(s) Oral daily  enoxaparin Injectable 65milliGRAM(s) SubCutaneous every 12 hours  sotalol 80milliGRAM(s) Oral two times a day  docusate sodium 100milliGRAM(s) Oral three times a day    MEDICATIONS  (PRN):  benzocaine 15 mG/menthol 3.6 mG Lozenge 1Lozenge Oral every 4 hours PRN Sore Throat      LABS:	 	    CARDIAC MARKERS:  CARDIAC MARKERS ( 28 May 2017 03:10 )  x     / < 0.06 ng/mL / 203 u/L / 3.10 ng/mL / x      CARDIAC MARKERS ( 27 May 2017 20:30 )  x     / < 0.06 ng/mL / 255 u/L / 3.61 ng/mL / x                                    11.6   8.30  )-----------( 180      ( 30 May 2017 05:57 )             35.4     05-30    144  |  105  |  17  ----------------------------<  89  4.0   |  24  |  0.70    Ca    9.3      30 May 2017 05:57  Mg     2.0     05-29      COAGS:       proBNP:   Lipid Profile:   HgA1c:   TSH:       PHYSICAL EXAM:  T(C): 36.7, Max: 36.9 (05-30 @ 06:23)  HR: 47 (43 - 47)  BP: 107/70 (99/62 - 107/70)  RR: 18 (18 - 18)  SpO2: 100% (98% - 100%)  Wt(kg): --  I&O's Summary        HEENT:   Normal oral mucosa, PERRL, EOMI	  Lymphatic: No obvious lymphadenopathy , no edema  Cardiovascular: Normal S1 S2, No JVD, 1/6 CAROLE murmur, Peripheral pulses palpable 2+ bilaterally  Respiratory: Lungs clear to auscultation, normal effort 	  Gastrointestinal:  Soft, Non-tender, + BS	  Skin: No rashes,  No cyanosis, warm to touch  Musculoskeletal: Normal range of motion, normal strength  Psychiatry:  Appropriate Mood & affect     TELEMETRY: 	  sinus ashley 50s (in the AM in the 30s)   RADIOLOGY:     DIAGNOSTIC TESTING:  [ ] Echocardiogram:   CONCLUSIONS:  1. Normal mitral valve. Mild mitral regurgitation.  2. Normal left ventricular systolic function. No segmental  wall motion abnormalities.  3. Normal right ventricular size and function.  4. Estimated right ventricular systolic pressure equals 30  mm Hg, assuming right atrial pressure equals 10 mm Hg,  consistent with normal pulmonary pressures.  5. Normal tricuspid valve. Mild tricuspid regurgitation.  *** No previous Echo exam.  ------------------------------------------------------------------------  Confirmed on  3/5/2014      [ ]  Catheterization: none    [ ] Stress Test:  none  OTHER: 	      ASSESSMENT/PLAN: 	67y Female h/o borderline DM , no h/o CAD /MI with historically normal LV function on TTE 2014, a/w symptomatic new PAF which has since spontaneously terminated to NSR/sinus bradycardia with normal TSH normal.with CHADS-VASC 2-3.    -agree with lifelong A/C. Start whichever NOAC is covered by her insurance- for now c/w therapeutic Lovenox  - metoprolol dced - start sotalol 80mg bid initiated (has received 2 doses thus far ) , check qtc 2 hours after every dose of sotalol, continue as long as QTC < 500 ms (thus far QTC has been < 500 - follow up EKG s/p evening dose Sotalol tonight)   - asymptomatic sinus ashley this AM - c/w Sotalol for now  -excellent candidate for outpatient Afib ablation if repeat echo normal

## 2017-05-30 NOTE — PROGRESS NOTE ADULT - ATTENDING COMMENTS
EP ATTENDING    Agree with above. Tolerating sotalol well. Recommend lifelong A/C and outpatient atrial fibrillation ablation if/when she fails sotalol. Check QTc tomorrow morning prior to discharge to assure QTc < 500 ms.

## 2017-05-31 VITALS
RESPIRATION RATE: 18 BRPM | OXYGEN SATURATION: 100 % | HEART RATE: 46 BPM | TEMPERATURE: 98 F | DIASTOLIC BLOOD PRESSURE: 59 MMHG | SYSTOLIC BLOOD PRESSURE: 95 MMHG

## 2017-05-31 LAB
BUN SERPL-MCNC: 15 MG/DL — SIGNIFICANT CHANGE UP (ref 7–23)
CALCIUM SERPL-MCNC: 9.3 MG/DL — SIGNIFICANT CHANGE UP (ref 8.4–10.5)
CHLORIDE SERPL-SCNC: 104 MMOL/L — SIGNIFICANT CHANGE UP (ref 98–107)
CO2 SERPL-SCNC: 26 MMOL/L — SIGNIFICANT CHANGE UP (ref 22–31)
CREAT SERPL-MCNC: 0.74 MG/DL — SIGNIFICANT CHANGE UP (ref 0.5–1.3)
GLUCOSE SERPL-MCNC: 98 MG/DL — SIGNIFICANT CHANGE UP (ref 70–99)
HCT VFR BLD CALC: 36.6 % — SIGNIFICANT CHANGE UP (ref 34.5–45)
HGB BLD-MCNC: 11.8 G/DL — SIGNIFICANT CHANGE UP (ref 11.5–15.5)
MAGNESIUM SERPL-MCNC: 2 MG/DL — SIGNIFICANT CHANGE UP (ref 1.6–2.6)
MCHC RBC-ENTMCNC: 23.8 PG — LOW (ref 27–34)
MCHC RBC-ENTMCNC: 32.2 % — SIGNIFICANT CHANGE UP (ref 32–36)
MCV RBC AUTO: 73.8 FL — LOW (ref 80–100)
PHOSPHATE SERPL-MCNC: 3.8 MG/DL — SIGNIFICANT CHANGE UP (ref 2.5–4.5)
PLATELET # BLD AUTO: 175 K/UL — SIGNIFICANT CHANGE UP (ref 150–400)
PMV BLD: SIGNIFICANT CHANGE UP FL (ref 7–13)
POTASSIUM SERPL-MCNC: 4.4 MMOL/L — SIGNIFICANT CHANGE UP (ref 3.5–5.3)
POTASSIUM SERPL-SCNC: 4.4 MMOL/L — SIGNIFICANT CHANGE UP (ref 3.5–5.3)
RBC # BLD: 4.96 M/UL — SIGNIFICANT CHANGE UP (ref 3.8–5.2)
RBC # FLD: 15.7 % — HIGH (ref 10.3–14.5)
SODIUM SERPL-SCNC: 141 MMOL/L — SIGNIFICANT CHANGE UP (ref 135–145)
WBC # BLD: 8.06 K/UL — SIGNIFICANT CHANGE UP (ref 3.8–10.5)
WBC # FLD AUTO: 8.06 K/UL — SIGNIFICANT CHANGE UP (ref 3.8–10.5)

## 2017-05-31 PROCEDURE — 93010 ELECTROCARDIOGRAM REPORT: CPT

## 2017-05-31 RX ORDER — SOTALOL HCL 120 MG
1 TABLET ORAL
Qty: 60 | Refills: 0 | OUTPATIENT
Start: 2017-05-31 | End: 2017-06-30

## 2017-05-31 RX ADMIN — Medication 80 MILLIGRAM(S): at 06:52

## 2017-05-31 RX ADMIN — APIXABAN 5 MILLIGRAM(S): 2.5 TABLET, FILM COATED ORAL at 06:41

## 2017-05-31 RX ADMIN — Medication 1 TABLET(S): at 12:36

## 2017-05-31 RX ADMIN — Medication 100 MILLIGRAM(S): at 06:41

## 2017-05-31 RX ADMIN — BENZOCAINE AND MENTHOL 1 LOZENGE: 5; 1 LIQUID ORAL at 06:52

## 2017-05-31 RX ADMIN — Medication 100 MILLIGRAM(S): at 12:36

## 2017-05-31 NOTE — DISCHARGE NOTE ADULT - PATIENT PORTAL LINK FT
“You can access the [employer] Patient Portal, offered by [facility], by registering with the following website: [email or web address]”

## 2017-05-31 NOTE — DISCHARGE NOTE ADULT - CARE PROVIDER_API CALL
Osito Powers), Cardiac Electrophysiology; Cardiovascular Disease; Internal Medicine; Nuclear Cardiology  [address]  [place] [address]  Phone: [phone]  Fax: [phone]    Marcell Colunga), Cardiovascular Disease; Internal Medicine  [address] [place] [address]  Phone: [phone]  Fax: [phone]

## 2017-05-31 NOTE — DISCHARGE NOTE ADULT - PLAN OF CARE
prevent palpitations continue with Eliquis 5mg BID to prevent CVA  follow up with your cardiologist in 1-2 weeks A1C 6.2  Low calorie diet

## 2017-05-31 NOTE — PROGRESS NOTE ADULT - SUBJECTIVE AND OBJECTIVE BOX
EP ATTENDING    Tolerating sotalol well - QTc 430ms this morning    Tele: NSR, sinus ashley    No palpitations, no syncope, no angina.    multivitamin 1Tablet(s) Oral daily  benzocaine 15 mG/menthol 3.6 mG Lozenge 1Lozenge Oral every 4 hours PRN  sotalol 80milliGRAM(s) Oral two times a day  docusate sodium 100milliGRAM(s) Oral three times a day  apixaban 5milliGRAM(s) Oral two times a day                            11.8   8.06  )-----------( 175      ( 31 May 2017 06:45 )             36.6       05-31    141  |  104  |  15  ----------------------------<  98  4.4   |  26  |  0.74    Ca    9.3      31 May 2017 06:45  Phos  3.8     05-31  Mg     2.0     05-31    T(C): 36.7, Max: 36.8 (05-30 @ 20:11)  HR: 47 (46 - 50)  BP: 109/61 (94/61 - 109/61)  RR: 18 (18 - 18)  SpO2: 100% (100% - 100%)  Wt(kg): --    HEENT:   Normal oral mucosa, PERRL, EOMI	  Lymphatic: No obvious lymphadenopathy , no edema  Cardiovascular: Normal S1 S2, No JVD, 1/6 CAROLE murmur, Peripheral pulses palpable 2+ bilaterally  Respiratory: Lungs clear to auscultation, normal effort 	  Gastrointestinal:  Soft, Non-tender, + BS	  Skin: No rashes,  No cyanosis, warm to touch  Musculoskeletal: Normal range of motion, normal strength  Psychiatry:  Appropriate Mood & affect     Echo: normal	      ASSESSMENT/PLAN: 	68 y/o female PMH DM admitted with new onset PAF which spontaneous terminated to NSR. TSH/Echo normal. Tolerating sotalol well. CHADS-VASC 3.    -continue eliquis 5mg bid and sotalol 80mg bid  -d/c home  -lifelong anticoagulation  -excellent candidate for outpatient PAF ablation if/when she fails AAD therapy with sotalol    Osito Powers M.D., [facility]  [phone]

## 2017-05-31 NOTE — DISCHARGE NOTE ADULT - HOSPITAL COURSE
68 y/o F with PMH of borderline DM(diet controlled) presented with the complaint of CP and palpitations. Admitted to telemetry for new onset atrial fibrillation    +New onset atrial fibrillation-On Lovenox BID, on Toprol XL 12.5mg     Hospital Course:  EKG: Atrial fibrillation with RVR at a rate of 104 with early repolarization V5-V6 with Qtc of 357  CE x 2 Trop: Negative, CK: 255  Gluc: 123  Prelim CXR: No urgent/emergent findings  5/28 /64-> 96/57  Will hold discharge for today. Give some IVF and monitor on tele overnight.  5/29 cardio- monitor on tele, awaiting echo, EP c/s for possible antirythmic vs PPM  05/29 EP Cs- Admitted with symptomatic new PAF which has since spontaneously terminated to NSR/sinus bradycardia. TSH normal. Echo pending (normal last year). QTc in  ms. CHADS-VASC 2-3.  agree with lifelong A/C. Start whichever NOAC is covered by her insurance-d/c metoprolol, start sotalol 80mg bid, check qtc 2 hours after every dose of sotalol, continue as long as QTC < 500 ms  -d/c home when repeat echo unremarkable (can be done as outpatient if needed) and QTc stays < 500 after 3 doses of sotalol minimum  -excellent candidate for outpatient Afib ablation if repeat echo normal    5/30 EP: Tolerating sotalol well. Recommend lifelong A/C and outpatient atrial fibrillation ablation if/when she fails sotalol. Check QTc tomorrow morning prior to discharge to assure QTc < 500 ms.   5/30 Cardio: trial of sotalol. f/u echo results.  dc planning in am if stable. Eliquis 5mg BID started/covered   5/30 TTE: 1. Normal left ventricular internal dimensions and wall  thicknesses.  2. Normal left ventricular systolic function. No segmental  wall motion abnormalities.  3. Normal right ventricular size and function. 68 y/o F with PMH of borderline DM(diet controlled) presented with the complaint of CP and palpitations. Admitted to telemetry for new onset atrial fibrillation    +New onset atrial fibrillation-On Lovenox BID, on Toprol XL 12.5mg     Hospital Course:  EKG: Atrial fibrillation with RVR at a rate of 104 with early repolarization V5-V6 with Qtc of 357  CE x 2 Trop: Negative, CK: 255  Gluc: 123  Prelim CXR: No urgent/emergent findings  5/28 /64-> 96/57  Will hold discharge for today. Give some IVF and monitor on tele overnight.  5/29 cardio- monitor on tele, awaiting echo, EP c/s for possible antirythmic vs PPM  05/29 EP Cs- Admitted with symptomatic new PAF which has since spontaneously terminated to NSR/sinus bradycardia. TSH normal. Echo pending (normal last year). QTc in  ms. CHADS-VASC 2-3.  agree with lifelong A/C. Start whichever NOAC is covered by her insurance-d/c metoprolol, start sotalol 80mg bid, check qtc 2 hours after every dose of sotalol, continue as long as QTC < 500 ms  -d/c home when repeat echo unremarkable (can be done as outpatient if needed) and QTc stays < 500 after 3 doses of sotalol minimum  -excellent candidate for outpatient Afib ablation if repeat echo normal    5/30 EP: Tolerating sotalol well. Recommend lifelong A/C and outpatient atrial fibrillation ablation if/when she fails sotalol. Check QTc tomorrow morning prior to discharge to assure QTc < 500 ms.   5/30 Cardio: trial of sotalol. f/u echo results.  dc planning in am if stable. Eliquis 5mg BID started/covered   5/30 TTE:  Normal left ventricular internal dimensions and wall thicknesses.  Normal left ventricular systolic function. No segmental  wall motion abnormalities.  Normal right ventricular size and function.    Patient had some episodes of asymptomatic bradycardia.  She remained stable and is now clear for discharge home.

## 2017-05-31 NOTE — DISCHARGE NOTE ADULT - CARE PLAN
Principal Discharge DX:	Atrial fibrillation  Goal:	prevent palpitations  Instructions for follow-up, activity and diet:	continue with Eliquis 5mg BID to prevent CVA  follow up with your cardiologist in 1-2 weeks  Secondary Diagnosis:	Borderline diabetes  Instructions for follow-up, activity and diet:	A1C 6.2  Low calorie diet

## 2017-05-31 NOTE — DISCHARGE NOTE ADULT - MEDICATION SUMMARY - MEDICATIONS TO TAKE
I will START or STAY ON the medications listed below when I get home from the hospital:    sotalol 80 mg oral tablet  -- 1 tab(s) by mouth 2 times a day  -- Indication: For Atrial fibrillation    Eliquis 5 mg oral tablet  -- 1 tab(s) by mouth 2 times a day  -- Check with your doctor before becoming pregnant.  It is very important that you take or use this exactly as directed.  Do not skip doses or discontinue unless directed by your doctor.  Obtain medical advice before taking any non-prescription drugs as some may affect the action of this medication.    -- Indication: For Atrial fibrillation    Multiple Vitamins oral tablet  -- 1 tab(s) by mouth once a day  -- Indication: For vitamin

## 2017-06-02 ENCOUNTER — TRANSCRIPTION ENCOUNTER (OUTPATIENT)
Age: 67
End: 2017-06-02

## 2017-06-28 ENCOUNTER — APPOINTMENT (OUTPATIENT)
Dept: ENDOCRINOLOGY | Facility: CLINIC | Age: 67
End: 2017-06-28

## 2017-07-03 PROBLEM — R73.03 PREDIABETES: Chronic | Status: ACTIVE | Noted: 2017-05-27

## 2017-07-14 ENCOUNTER — APPOINTMENT (OUTPATIENT)
Dept: ENDOCRINOLOGY | Facility: CLINIC | Age: 67
End: 2017-07-14

## 2017-07-14 VITALS
HEIGHT: 61 IN | BODY MASS INDEX: 27.38 KG/M2 | WEIGHT: 145 LBS | OXYGEN SATURATION: 98 % | SYSTOLIC BLOOD PRESSURE: 120 MMHG | HEART RATE: 50 BPM | DIASTOLIC BLOOD PRESSURE: 76 MMHG

## 2017-07-14 DIAGNOSIS — R73.09 OTHER ABNORMAL GLUCOSE: ICD-10-CM

## 2017-07-14 RX ORDER — APIXABAN 5 MG/1
5 TABLET, FILM COATED ORAL
Qty: 60 | Refills: 0 | Status: ACTIVE | COMMUNITY
Start: 2017-06-05

## 2017-07-14 RX ORDER — RIVAROXABAN 20 MG/1
20 TABLET, FILM COATED ORAL
Qty: 30 | Refills: 0 | Status: COMPLETED | COMMUNITY
Start: 2017-05-30

## 2017-07-14 RX ORDER — CYCLOSPORINE 0.5 MG/ML
0.05 EMULSION OPHTHALMIC
Qty: 180 | Refills: 0 | Status: ACTIVE | COMMUNITY
Start: 2016-09-09

## 2017-07-14 RX ORDER — BUTALBITAL, ACETAMINOPHEN AND CAFFEINE 325; 50; 40 MG/1; MG/1; MG/1
50-325-40 TABLET ORAL
Qty: 20 | Refills: 0 | Status: COMPLETED | COMMUNITY
Start: 2017-06-21

## 2017-07-14 RX ORDER — DENOSUMAB 60 MG/ML
60 INJECTION SUBCUTANEOUS
Qty: 1 | Refills: 0 | Status: COMPLETED | OUTPATIENT
Start: 2017-07-14

## 2017-07-14 RX ORDER — HYDROXYZINE HYDROCHLORIDE 25 MG/1
25 TABLET ORAL
Qty: 30 | Refills: 0 | Status: COMPLETED | COMMUNITY
Start: 2017-06-29

## 2017-07-14 RX ADMIN — DENOSUMAB 0 MG/ML: 60 INJECTION SUBCUTANEOUS at 00:00

## 2017-10-05 ENCOUNTER — APPOINTMENT (OUTPATIENT)
Dept: OPHTHALMOLOGY | Facility: CLINIC | Age: 67
End: 2017-10-05
Payer: MEDICARE

## 2017-10-05 PROCEDURE — 92014 COMPRE OPH EXAM EST PT 1/>: CPT

## 2018-01-10 ENCOUNTER — EMERGENCY (EMERGENCY)
Facility: HOSPITAL | Age: 68
LOS: 1 days | Discharge: HOME | End: 2018-01-10
Attending: EMERGENCY MEDICINE | Admitting: EMERGENCY MEDICINE
Payer: COMMERCIAL

## 2018-01-10 VITALS
RESPIRATION RATE: 16 BRPM | OXYGEN SATURATION: 98 % | TEMPERATURE: 98 F | DIASTOLIC BLOOD PRESSURE: 57 MMHG | SYSTOLIC BLOOD PRESSURE: 97 MMHG | HEART RATE: 59 BPM

## 2018-01-10 VITALS
RESPIRATION RATE: 18 BRPM | OXYGEN SATURATION: 100 % | HEART RATE: 67 BPM | SYSTOLIC BLOOD PRESSURE: 124 MMHG | DIASTOLIC BLOOD PRESSURE: 76 MMHG

## 2018-01-10 DIAGNOSIS — Z90.710 ACQUIRED ABSENCE OF BOTH CERVIX AND UTERUS: Chronic | ICD-10-CM

## 2018-01-10 LAB
ALBUMIN SERPL ELPH-MCNC: 3.6 G/DL — SIGNIFICANT CHANGE UP (ref 3.3–5)
ALP SERPL-CCNC: 39 U/L — LOW (ref 40–120)
ALT FLD-CCNC: 15 U/L RC — SIGNIFICANT CHANGE UP (ref 10–45)
ANION GAP SERPL CALC-SCNC: 8 MMOL/L — SIGNIFICANT CHANGE UP (ref 5–17)
AST SERPL-CCNC: 19 U/L — SIGNIFICANT CHANGE UP (ref 10–40)
BASOPHILS # BLD AUTO: 0 K/UL — SIGNIFICANT CHANGE UP (ref 0–0.2)
BASOPHILS NFR BLD AUTO: 0.5 % — SIGNIFICANT CHANGE UP (ref 0–2)
BILIRUB SERPL-MCNC: 0.2 MG/DL — SIGNIFICANT CHANGE UP (ref 0.2–1.2)
BUN SERPL-MCNC: 13 MG/DL — SIGNIFICANT CHANGE UP (ref 7–23)
CALCIUM SERPL-MCNC: 9.5 MG/DL — SIGNIFICANT CHANGE UP (ref 8.4–10.5)
CHLORIDE SERPL-SCNC: 106 MMOL/L — SIGNIFICANT CHANGE UP (ref 96–108)
CK MB CFR SERPL CALC: 1.7 NG/ML — SIGNIFICANT CHANGE UP (ref 0–3.8)
CO2 SERPL-SCNC: 30 MMOL/L — SIGNIFICANT CHANGE UP (ref 22–31)
CREAT SERPL-MCNC: 0.77 MG/DL — SIGNIFICANT CHANGE UP (ref 0.5–1.3)
EOSINOPHIL # BLD AUTO: 0 K/UL — SIGNIFICANT CHANGE UP (ref 0–0.5)
EOSINOPHIL NFR BLD AUTO: 0.5 % — SIGNIFICANT CHANGE UP (ref 0–6)
GLUCOSE SERPL-MCNC: 98 MG/DL — SIGNIFICANT CHANGE UP (ref 70–99)
HCT VFR BLD CALC: 36.6 % — SIGNIFICANT CHANGE UP (ref 34.5–45)
HGB BLD-MCNC: 12 G/DL — SIGNIFICANT CHANGE UP (ref 11.5–15.5)
LIDOCAIN IGE QN: 30 U/L — SIGNIFICANT CHANGE UP (ref 7–60)
LYMPHOCYTES # BLD AUTO: 1.2 K/UL — SIGNIFICANT CHANGE UP (ref 1–3.3)
LYMPHOCYTES # BLD AUTO: 14.9 % — SIGNIFICANT CHANGE UP (ref 13–44)
MCHC RBC-ENTMCNC: 25.8 PG — LOW (ref 27–34)
MCHC RBC-ENTMCNC: 33 GM/DL — SIGNIFICANT CHANGE UP (ref 32–36)
MCV RBC AUTO: 78.3 FL — LOW (ref 80–100)
MONOCYTES # BLD AUTO: 0.6 K/UL — SIGNIFICANT CHANGE UP (ref 0–0.9)
MONOCYTES NFR BLD AUTO: 6.7 % — SIGNIFICANT CHANGE UP (ref 2–14)
NEUTROPHILS # BLD AUTO: 6.5 K/UL — SIGNIFICANT CHANGE UP (ref 1.8–7.4)
NEUTROPHILS NFR BLD AUTO: 77.4 % — HIGH (ref 43–77)
PLATELET # BLD AUTO: 137 K/UL — LOW (ref 150–400)
POTASSIUM SERPL-MCNC: 3.9 MMOL/L — SIGNIFICANT CHANGE UP (ref 3.5–5.3)
POTASSIUM SERPL-SCNC: 3.9 MMOL/L — SIGNIFICANT CHANGE UP (ref 3.5–5.3)
PROT SERPL-MCNC: 7.2 G/DL — SIGNIFICANT CHANGE UP (ref 6–8.3)
RBC # BLD: 4.67 M/UL — SIGNIFICANT CHANGE UP (ref 3.8–5.2)
RBC # FLD: 13.2 % — SIGNIFICANT CHANGE UP (ref 10.3–14.5)
SODIUM SERPL-SCNC: 144 MMOL/L — SIGNIFICANT CHANGE UP (ref 135–145)
TROPONIN T SERPL-MCNC: <0.01 NG/ML — SIGNIFICANT CHANGE UP (ref 0–0.06)
TROPONIN T SERPL-MCNC: <0.01 NG/ML — SIGNIFICANT CHANGE UP (ref 0–0.06)
WBC # BLD: 8.4 K/UL — SIGNIFICANT CHANGE UP (ref 3.8–10.5)
WBC # FLD AUTO: 8.4 K/UL — SIGNIFICANT CHANGE UP (ref 3.8–10.5)

## 2018-01-10 PROCEDURE — 71046 X-RAY EXAM CHEST 2 VIEWS: CPT | Mod: 26

## 2018-01-10 PROCEDURE — 84484 ASSAY OF TROPONIN QUANT: CPT

## 2018-01-10 PROCEDURE — 93005 ELECTROCARDIOGRAM TRACING: CPT

## 2018-01-10 PROCEDURE — 80053 COMPREHEN METABOLIC PANEL: CPT

## 2018-01-10 PROCEDURE — 82553 CREATINE MB FRACTION: CPT

## 2018-01-10 PROCEDURE — 99285 EMERGENCY DEPT VISIT HI MDM: CPT | Mod: 25

## 2018-01-10 PROCEDURE — 71046 X-RAY EXAM CHEST 2 VIEWS: CPT

## 2018-01-10 PROCEDURE — 99284 EMERGENCY DEPT VISIT MOD MDM: CPT | Mod: 25

## 2018-01-10 PROCEDURE — 85027 COMPLETE CBC AUTOMATED: CPT

## 2018-01-10 PROCEDURE — 93010 ELECTROCARDIOGRAM REPORT: CPT

## 2018-01-10 PROCEDURE — 83690 ASSAY OF LIPASE: CPT

## 2018-01-10 RX ORDER — FAMOTIDINE 10 MG/ML
40 INJECTION INTRAVENOUS ONCE
Qty: 0 | Refills: 0 | Status: COMPLETED | OUTPATIENT
Start: 2018-01-10 | End: 2018-01-10

## 2018-01-10 RX ORDER — SODIUM CHLORIDE 9 MG/ML
3 INJECTION INTRAMUSCULAR; INTRAVENOUS; SUBCUTANEOUS ONCE
Qty: 0 | Refills: 0 | Status: COMPLETED | OUTPATIENT
Start: 2018-01-10 | End: 2018-01-10

## 2018-01-10 RX ADMIN — SODIUM CHLORIDE 3 MILLILITER(S): 9 INJECTION INTRAMUSCULAR; INTRAVENOUS; SUBCUTANEOUS at 08:09

## 2018-01-10 RX ADMIN — FAMOTIDINE 40 MILLIGRAM(S): 10 INJECTION INTRAVENOUS at 13:18

## 2018-01-10 NOTE — ED ADULT NURSE NOTE - OBJECTIVE STATEMENT
68 y/o female presenting to ED c/o palpitations. Pt states she has pmhx of afib on eliquis, dmt2, and hld. Pt. states palpitations only lasted a few seconds. At this time denies chest pain, sob, ha, n/v/d, abdominal pain, f/c, urinary symptoms, hematuria. A&Ox4 gross neuro intact, lungs cta bilaterally, no difficulty speaking in complete sentences, s1s2 heart sounds heard, pt. sinus bradycardic on the cardiac monitor, pulses x 4, sosa x4, abdomen soft nontender nondistended, skin intact. Safety and comfort measures maintained. Continuous cardiac monitoring maintained. Patient undressed and placed into gown, call bell in hand and side rails up for safety. warm blanket provided, vital signs stable, pt in no acute distress.

## 2018-01-10 NOTE — ED PROVIDER NOTE - OBJECTIVE STATEMENT
67 year old female with known hx of atrial fibrillation on eliquis and previously treated for HTN & DM presents with episodic palpitations twice within the last hour.  Without any chest pain or shortness of breath, lightheadedness, visual changes, headache or dizziness.  Patient states that pressure was checked at home noted to be 190/90.  Mildly anxious as her brother passed away  3 weeks ago in [place].  Denies any fevers, chills. 67 year old female with known hx of atrial fibrillation on eliquis and previously treated for HTN & DM presents with episodic palpitations twice within the last hour.  Without any chest pain or shortness of breath, lightheadedness, visual changes, headache or dizziness.  Patient states that pressure was checked at home noted to be 190/90.  Mildly anxious as her brother passed away  3 weeks ago in [place].  Denies any fevers, chills.    Last admittd on 5/17, and noted cards note stating that she had new onset PAF which spontaneous terminated to NSR. TSH/Echo normal. Tolerating sotalol well. CHADS-VASC 3. She was a good candidate for outpatient PAF ablation if/when she fails AAD therapy with sotalol.  Sotalol was discontinued as per pt.

## 2018-01-10 NOTE — ED PROVIDER NOTE - NOTES
Cardiologist follows patient closely outpatient. Was seen last week in her office. Maximized medical therapy without tolerance.  Patient to follow up Tuesday as per cardiology.  She has been observed and is hemodynamically stable for 4 hours.  Two negative trops, sinus bradycardia on EKG.

## 2018-01-10 NOTE — ED ADULT NURSE REASSESSMENT NOTE - NS ED NURSE REASSESS COMMENT FT1
Pt resting comfortably in hallway 4 at this time. VSS NAD at this time. Currently awaiting repeat troponin results. Pt. most likely to be d/c. Safety and comfort measures maintained.

## 2018-01-10 NOTE — ED PROVIDER NOTE - MEDICAL DECISION MAKING DETAILS
Cardiac workup, cardiac monitoring Cardiac workup, cardiac monitoring.  Advised to follow up with Dr. Powers in

## 2018-01-26 ENCOUNTER — APPOINTMENT (OUTPATIENT)
Dept: ENDOCRINOLOGY | Facility: CLINIC | Age: 68
End: 2018-01-26
Payer: MEDICARE

## 2018-01-26 VITALS
OXYGEN SATURATION: 98 % | HEIGHT: 61 IN | SYSTOLIC BLOOD PRESSURE: 92 MMHG | BODY MASS INDEX: 26.81 KG/M2 | WEIGHT: 142 LBS | DIASTOLIC BLOOD PRESSURE: 56 MMHG | HEART RATE: 60 BPM

## 2018-01-26 PROCEDURE — 96372 THER/PROPH/DIAG INJ SC/IM: CPT

## 2018-01-26 PROCEDURE — 99214 OFFICE O/P EST MOD 30 MIN: CPT | Mod: 25

## 2018-01-26 RX ORDER — CICLOPIROX 80 MG/ML
8 SOLUTION TOPICAL
Qty: 6 | Refills: 0 | Status: DISCONTINUED | COMMUNITY
Start: 2017-03-08 | End: 2018-01-26

## 2018-01-26 RX ORDER — ECONAZOLE NITRATE 10 MG/G
1 CREAM TOPICAL
Qty: 85 | Refills: 0 | Status: DISCONTINUED | COMMUNITY
Start: 2017-03-08 | End: 2018-01-26

## 2018-01-26 RX ORDER — AZELASTINE HYDROCHLORIDE 137 UG/1
0.1 SPRAY, METERED NASAL
Qty: 30 | Refills: 0 | Status: COMPLETED | COMMUNITY
Start: 2017-09-27

## 2018-01-26 RX ORDER — SOTALOL HYDROCHLORIDE 80 MG/1
80 TABLET ORAL
Qty: 60 | Refills: 0 | Status: DISCONTINUED | COMMUNITY
Start: 2017-06-05 | End: 2018-01-26

## 2018-01-26 RX ADMIN — DENOSUMAB 0 MG/ML: 60 INJECTION SUBCUTANEOUS at 00:00

## 2018-04-04 ENCOUNTER — APPOINTMENT (OUTPATIENT)
Dept: ORTHOPEDIC SURGERY | Facility: CLINIC | Age: 68
End: 2018-04-04
Payer: MEDICARE

## 2018-04-04 VITALS
DIASTOLIC BLOOD PRESSURE: 74 MMHG | WEIGHT: 145 LBS | SYSTOLIC BLOOD PRESSURE: 116 MMHG | HEIGHT: 61 IN | HEART RATE: 63 BPM | BODY MASS INDEX: 27.38 KG/M2

## 2018-04-04 DIAGNOSIS — M17.11 UNILATERAL PRIMARY OSTEOARTHRITIS, RIGHT KNEE: ICD-10-CM

## 2018-04-04 DIAGNOSIS — M17.12 UNILATERAL PRIMARY OSTEOARTHRITIS, LEFT KNEE: ICD-10-CM

## 2018-04-04 PROCEDURE — 73562 X-RAY EXAM OF KNEE 3: CPT | Mod: RT

## 2018-04-04 PROCEDURE — 99213 OFFICE O/P EST LOW 20 MIN: CPT

## 2018-04-06 PROBLEM — M17.11 PRIMARY OSTEOARTHRITIS OF RIGHT KNEE: Status: ACTIVE | Noted: 2018-04-06

## 2018-04-06 PROBLEM — M17.12 PRIMARY OSTEOARTHRITIS OF LEFT KNEE: Status: ACTIVE | Noted: 2018-04-06

## 2018-04-24 ENCOUNTER — APPOINTMENT (OUTPATIENT)
Dept: ORTHOPEDIC SURGERY | Facility: CLINIC | Age: 68
End: 2018-04-24
Payer: MEDICARE

## 2018-04-24 VITALS
BODY MASS INDEX: 27.38 KG/M2 | DIASTOLIC BLOOD PRESSURE: 75 MMHG | HEART RATE: 60 BPM | HEIGHT: 61 IN | WEIGHT: 145 LBS | SYSTOLIC BLOOD PRESSURE: 116 MMHG

## 2018-04-24 DIAGNOSIS — G89.29 PAIN IN RIGHT KNEE: ICD-10-CM

## 2018-04-24 DIAGNOSIS — M25.562 PAIN IN RIGHT KNEE: ICD-10-CM

## 2018-04-24 DIAGNOSIS — M25.561 PAIN IN RIGHT KNEE: ICD-10-CM

## 2018-04-24 PROCEDURE — 20610 DRAIN/INJ JOINT/BURSA W/O US: CPT | Mod: 50

## 2018-04-26 ENCOUNTER — APPOINTMENT (OUTPATIENT)
Dept: OPHTHALMOLOGY | Facility: CLINIC | Age: 68
End: 2018-04-26
Payer: MEDICARE

## 2018-04-26 PROCEDURE — 92014 COMPRE OPH EXAM EST PT 1/>: CPT

## 2018-05-08 ENCOUNTER — MESSAGE (OUTPATIENT)
Age: 68
End: 2018-05-08

## 2018-07-27 ENCOUNTER — APPOINTMENT (OUTPATIENT)
Dept: ENDOCRINOLOGY | Facility: CLINIC | Age: 68
End: 2018-07-27
Payer: MEDICARE

## 2018-07-27 VITALS
HEART RATE: 47 BPM | SYSTOLIC BLOOD PRESSURE: 100 MMHG | OXYGEN SATURATION: 98 % | HEIGHT: 61 IN | DIASTOLIC BLOOD PRESSURE: 64 MMHG | BODY MASS INDEX: 28.89 KG/M2 | WEIGHT: 153 LBS

## 2018-07-27 PROBLEM — I48.2 CHRONIC ATRIAL FIBRILLATION: Chronic | Status: ACTIVE | Noted: 2018-01-10

## 2018-07-27 PROCEDURE — 99214 OFFICE O/P EST MOD 30 MIN: CPT | Mod: 25

## 2018-07-27 PROCEDURE — 96372 THER/PROPH/DIAG INJ SC/IM: CPT

## 2018-07-27 RX ORDER — DENOSUMAB 60 MG/ML
60 INJECTION SUBCUTANEOUS
Qty: 0 | Refills: 0 | Status: COMPLETED | OUTPATIENT
Start: 2018-07-27

## 2018-07-27 RX ADMIN — DENOSUMAB 0 MG/ML: 60 INJECTION SUBCUTANEOUS at 00:00

## 2019-01-10 ENCOUNTER — MEDICATION RENEWAL (OUTPATIENT)
Age: 69
End: 2019-01-10

## 2019-01-14 ENCOUNTER — APPOINTMENT (OUTPATIENT)
Dept: OPHTHALMOLOGY | Facility: CLINIC | Age: 69
End: 2019-01-14
Payer: MEDICARE

## 2019-01-14 PROCEDURE — 92014 COMPRE OPH EXAM EST PT 1/>: CPT

## 2019-01-28 ENCOUNTER — APPOINTMENT (OUTPATIENT)
Dept: ENDOCRINOLOGY | Facility: CLINIC | Age: 69
End: 2019-01-28
Payer: MEDICARE

## 2019-01-28 VITALS — OXYGEN SATURATION: 97 % | SYSTOLIC BLOOD PRESSURE: 104 MMHG | DIASTOLIC BLOOD PRESSURE: 70 MMHG | HEART RATE: 51 BPM

## 2019-01-28 VITALS — WEIGHT: 154 LBS | BODY MASS INDEX: 31.04 KG/M2 | HEIGHT: 59 IN

## 2019-01-28 DIAGNOSIS — E04.9 NONTOXIC GOITER, UNSPECIFIED: ICD-10-CM

## 2019-01-28 PROCEDURE — ZZZZZ: CPT

## 2019-01-28 PROCEDURE — 96401 CHEMO ANTI-NEOPL SQ/IM: CPT

## 2019-01-28 PROCEDURE — 99214 OFFICE O/P EST MOD 30 MIN: CPT | Mod: 25

## 2019-01-28 PROCEDURE — 77080 DXA BONE DENSITY AXIAL: CPT

## 2019-01-28 RX ORDER — DENOSUMAB 60 MG/ML
60 INJECTION SUBCUTANEOUS
Qty: 1 | Refills: 0 | Status: COMPLETED | OUTPATIENT
Start: 2019-01-28

## 2019-01-28 RX ADMIN — DENOSUMAB 0 MG/ML: 60 INJECTION SUBCUTANEOUS at 00:00

## 2019-01-28 NOTE — ASSESSMENT
[FreeTextEntry1] : f/u 69 y/o female  with:\par \par 1. Osteoporosis: previously treated with Fosamax between 2006 to 2013, stopped due to UGI sx. Low BMD in Oct 2015. Began Prolia December 2015. Taking correctly, tolerating well, no ONJ or thigh pain. BMD 12/16 essentially stable. BMD 1/2019 indicates further improvement in lowest site, spine. All other sites essentially stable osteopenia. Ca:9.3. Continue Prolia  from [employer]. \par \par 2. H/o subclinical hyperthyroidism: pt is clinically euthyroid on PE. Thyroid scan showed increased uptake of 29% from 17% from 2014 to 2015. Single subcentimeter L thyroid nodule stable. TSH:0.43\par \par 3. Prediabetes: A1C stable 6.2%. Well managed diet. Neg microalbumin. \par \par Of note, pt has Afib and bradycardia. she has had a few episodes of dizziness and is on a 7 day heart monitor.\par \par f/u in 6 mons.  Labs in [facility].  [Denosumab Therapy] : Risks  and benefits of denosumab therapy were discussed with the patient including eczema, cellulitis, osteonecrosis of the jaw and atypical femur fractures

## 2019-01-28 NOTE — PHYSICAL EXAM
[Alert] : alert [No Acute Distress] : no acute distress [Well Nourished] : well nourished [Well Developed] : well developed [Normal Sclera/Conjunctiva] : normal sclera/conjunctiva [EOMI] : extra ocular movement intact [No Proptosis] : no proptosis [Normal Oropharynx] : the oropharynx was normal [Thyroid Not Enlarged] : the thyroid was not enlarged [No Thyroid Nodules] : there were no palpable thyroid nodules [No Respiratory Distress] : no respiratory distress [No Accessory Muscle Use] : no accessory muscle use [Clear to Auscultation] : lungs were clear to auscultation bilaterally [Normal Rate] : heart rate was normal  [Normal S1, S2] : normal S1 and S2 [Regular Rhythm] : with a regular rhythm [Normal Bowel Sounds] : normal bowel sounds [Not Tender] : non-tender [Soft] : abdomen soft [Not Distended] : not distended [Anterior Cervical Nodes] : anterior cervical nodes [Normal] : normal and non tender [No Spinal Tenderness] : no spinal tenderness [Spine Straight] : spine straight [No Stigmata of Cushings Syndrome] : no stigmata of cushings syndrome [Normal Gait] : normal gait [Normal Strength/Tone] : muscle strength and tone were normal [No Rash] : no rash [Normal Reflexes] : deep tendon reflexes were 2+ and symmetric [No Tremors] : no tremors [Oriented x3] : oriented to person, place, and time

## 2019-01-28 NOTE — PROCEDURE
[FreeTextEntry1] : Bone mineral density: 01/28/2019 \par Indication: vs 2016\par Spine: -2.5 osteoporosis (+3.4%, +6.1% vs 2015)\par Total hip: -1.5 osteopenia, no significant change \par Femoral neck: -1.9 osteopenia (-5.0%, stable vs 2015)\par Proximal radius: -1.3 osteopenia, no significant change (+4.8% vs 2015)\par \par Thyroid ultrasound December 19, 2016\par Left hypoechoic nodule 8 x 6 x 8 mm\par \par Bone mineral density December 19, 2016\par Indication assess response to medication\par Spine -2.8, osteoporosis, no significant change\par Femoral neck -1.7, osteopenia, no significant change\par Total hip -1.5, osteopenia, no significant change\par Proximal radius -1.4, osteopenia, no significant change\par \par \par thyroid ultrasound  Sept 18, 2105\par General texture normal. Single left hypoechoic nodule suggestive of thyroiditis 7 x 7 x 8 mm\par \par bone mineral density test performed October 28, 2015\par spine -3.0, osteoporosis\par Total hip -1.6, osteopenia\par Femoral neck -1.9, osteopenia\par Proximal radius -1.7, osteopenia

## 2019-01-28 NOTE — HISTORY OF PRESENT ILLNESS
[Alendronate (Fosomax)] : Alendronate [Patient taking Meds Correctly] : Patient is taking meds correctly [Prolia (Denosumab)] : Prolia (Denosumab) [Premat. Menopause (surg)] : premature menopause which occurred surgically [Regular Dental Follow-Up] : regular dental follow-up [FreeTextEntry1] : f/u 68 year-old female with osteoporosis and thyroid disease.\par \par H/o osteoporosis for many years. Took Fosamax for 7 years approximately from 2006 until 2013, stopped due to UGI sx. BMD Oct 2015 low.  Began Prolia December 2015. Taking correctly, tolerating well, no ONJ or thigh pain. No interval fractures. Last DDS 2 mos ago. BMD 2017 stable. \par \par h/o low TSH 0.35, and concern for subclinical hyperthyroidism. Pt remains asympt. She has had several TSH values been low or low normal. T4 has remained normal. Prior TSH minimally decreased at 0.37 but free T4 low normal at 1.1 total T3 95. March 2018 TSH: 0.71. Thyroid scan July 2014, normal scan, 18% uptake. PCP repeated a thyroid uptake and scan on 10/13/15 which showed normal thyroid scan and uptake of 29%; increased from previous 17.5%. no hot or cold nodules. No h/o thyroid disease, nodules, exposure to RT, fhx, exposure lithium or amiodarone.\par \par Afib, added Eliquis. Of note, stable but low HR. \par \par A1c stable at 6.1% mother DM.  Pt is a vegetarian foods/vegetables. Pt limits rice to brown rice, whole wheat bread.  No soda, no juice and natural juice. Patient has howard candy from time to time, no sweet tooth habits.\par \par Pt born in [place].  [Disordered Eating] : no past or present history of disordered eating [Taking Steroids] : no past or present history of taking steroids [Kidney Stones] : no history of kidney stones [Excessive Alcohol Intake] : no past or present history of excessive alcohol intake [Sedentary] : no past or present history of a sedentary lifestyle [Current Smoking___(ppd)] : not currently smoking [Family History of Osteoporosis] : no family history of osteoporosis [Family History of Breast Cancer] : no family history of breast cancer [Family History of Hip Fracture] : no family history of hip fracture [Hyperparathyroidism] : no hyperparathyroidism [History of Radiation Therapy] : no history of radiation therapy [History of Blood Clots] : no history of blood clots [Previous Fragility Fracture] : no previous fragility fracture [de-identified] : Last dental visit 3 months ago

## 2019-01-28 NOTE — END OF VISIT
[FreeTextEntry3] : I, Melonie Marcano, authored this note working as a medical scribe for Dr. Bautista.  01/28/2019. 12:15PM. \par This note was authored by Melonie Marcano working as medical scribe for me. I have reviewed, edited, and revised the note as needed. I am in agreement with the exam findings, imaging findings, and treatment plan.  Gerhard Bautista MD

## 2019-01-28 NOTE — REVIEW OF SYSTEMS
[Heart Rate Is Slow] : slow heart rate [Negative] : Constitutional [Fatigue] : no fatigue [Decreased Appetite] : appetite not decreased [Visual Field Defect] : no visual field defect [Blurry Vision] : no blurred vision [Dysphagia] : no dysphagia [Dysphonia] : no dysphonia [Chest Pain] : no chest pain [Palpitations] : no palpitations [SOB on Exertion] : no shortness of breath during exertion [Constipation] : no constipation [Diarrhea] : no diarrhea [FreeTextEntry5] : Afib

## 2019-02-07 ENCOUNTER — APPOINTMENT (OUTPATIENT)
Dept: OTOLARYNGOLOGY | Facility: CLINIC | Age: 69
End: 2019-02-07
Payer: MEDICARE

## 2019-02-07 VITALS
SYSTOLIC BLOOD PRESSURE: 102 MMHG | WEIGHT: 147 LBS | HEIGHT: 60 IN | HEART RATE: 64 BPM | BODY MASS INDEX: 28.86 KG/M2 | DIASTOLIC BLOOD PRESSURE: 67 MMHG

## 2019-02-07 PROCEDURE — 31231 NASAL ENDOSCOPY DX: CPT

## 2019-02-07 PROCEDURE — 99213 OFFICE O/P EST LOW 20 MIN: CPT | Mod: 25

## 2019-02-07 NOTE — PHYSICAL EXAM
[Nasal Endoscopy Performed] : nasal endoscopy was performed, see procedure section for findings [Normal] : cardiovascular peripheral examination is normal

## 2019-02-07 NOTE — REASON FOR VISIT
[Subsequent Evaluation] : a subsequent evaluation for [Other: _____] : [unfilled] [FreeTextEntry2] : follow up left sinus polyp, post nasal drip

## 2019-02-07 NOTE — HISTORY OF PRESENT ILLNESS
[de-identified] : 68 year old female follow up left sinus polyp, post nasal drip.  States intermittent post nasal drip for years.  States occasional scratchy throat, voice hoarseness.   Denies nasal congestion, sinus pain/pressure, nasal discharge.  Denies use of nasal sprays.  \par

## 2019-02-07 NOTE — CONSULT LETTER
[Dear  ___] : Dear  [unfilled], [Courtesy Letter:] : I had the pleasure of seeing your patient, [unfilled], in my office today. [Please see my note below.] : Please see my note below. [Sincerely,] : Sincerely, [FreeTextEntry3] : Everett Hand MD, FACS \par  of Otolaryngology  \par [facility] \par [address] \[place] [address] \par Phone: [phone] \par Fax: [phone] \par \par

## 2019-02-07 NOTE — PROCEDURE
[FreeTextEntry6] : Nasal Endoscopy (21739)\par \par After informed verbal consent, nasal endoscopy was performed due to anterior rhinoscopy insufficient to account for symptoms. Flexible scope   was used.  Topical vasoconstrictor and anesthetic was used.  The exam showed a deviated septum to right  \par \par The nasal endoscope is passed via the right nasal cavity. The inferior, middle, and superior turbinates responded to vasoconstrictors. The inferior, middle and superior meati were examined. The osteomeatal complex is clear with no polyposis or purulence. The sphenoethmoidal recess is clear with no polyposis or purulence. The choana is patent. \par \par The nasal endoscope is passed via the left nasal cavity. The inferior, middle, and superior turbinates responded to vasoconstrictors. The inferior, middle and superior meati were examined. The osteomeatal complex is clear with no polyposis or purulence. The sphenoethmoidal recess is clear with no polyposis or purulence. The choana is patent.  \par \par There is no obstruction of the nasopharynx with adenoid tissue.\par \par  [de-identified] : Fiberoptic Laryngoscopy (34927)\par \par Procedure performed: Fiberoptic Laryngeal Endoscopy - Diagnostic\par Pre-op/post op indication: throat pain\par \par Findings: base of tongue and vallecula clear, hypopharynx normal without pooled secretions, crisp epiglottis, no evidence of laryngomalacia, bilateral vocal fold mobility full and symmetric. There was  significant arytenoids edema and  erythema seen , without  mass or lesions..\par

## 2019-02-12 NOTE — ED PROVIDER NOTE - CPE EDP RESP NORM
Benefits, risks, and possible complications of procedure explained to patient/caregiver who verbalized understanding and gave verbal consent. normal...

## 2019-02-13 ENCOUNTER — EMERGENCY (EMERGENCY)
Facility: HOSPITAL | Age: 69
LOS: 1 days | Discharge: HOME | End: 2019-02-13
Attending: EMERGENCY MEDICINE | Admitting: EMERGENCY MEDICINE
Payer: MEDICARE

## 2019-02-13 VITALS
TEMPERATURE: 98 F | HEART RATE: 53 BPM | SYSTOLIC BLOOD PRESSURE: 115 MMHG | RESPIRATION RATE: 20 BRPM | DIASTOLIC BLOOD PRESSURE: 58 MMHG | OXYGEN SATURATION: 100 %

## 2019-02-13 VITALS
HEART RATE: 58 BPM | SYSTOLIC BLOOD PRESSURE: 116 MMHG | DIASTOLIC BLOOD PRESSURE: 54 MMHG | TEMPERATURE: 98 F | OXYGEN SATURATION: 100 % | RESPIRATION RATE: 16 BRPM

## 2019-02-13 DIAGNOSIS — Z90.710 ACQUIRED ABSENCE OF BOTH CERVIX AND UTERUS: Chronic | ICD-10-CM

## 2019-02-13 LAB
ALBUMIN SERPL ELPH-MCNC: 4 G/DL — SIGNIFICANT CHANGE UP (ref 3.3–5)
ALP SERPL-CCNC: 52 U/L — SIGNIFICANT CHANGE UP (ref 40–120)
ALT FLD-CCNC: 16 U/L — SIGNIFICANT CHANGE UP (ref 4–33)
ANION GAP SERPL CALC-SCNC: 9 MMO/L — SIGNIFICANT CHANGE UP (ref 7–14)
APTT BLD: 39.8 SEC — HIGH (ref 27.5–36.3)
AST SERPL-CCNC: 24 U/L — SIGNIFICANT CHANGE UP (ref 4–32)
BASOPHILS # BLD AUTO: 0.05 K/UL — SIGNIFICANT CHANGE UP (ref 0–0.2)
BASOPHILS NFR BLD AUTO: 0.7 % — SIGNIFICANT CHANGE UP (ref 0–2)
BILIRUB SERPL-MCNC: 0.3 MG/DL — SIGNIFICANT CHANGE UP (ref 0.2–1.2)
BUN SERPL-MCNC: 9 MG/DL — SIGNIFICANT CHANGE UP (ref 7–23)
CALCIUM SERPL-MCNC: 9 MG/DL — SIGNIFICANT CHANGE UP (ref 8.4–10.5)
CHLORIDE SERPL-SCNC: 101 MMOL/L — SIGNIFICANT CHANGE UP (ref 98–107)
CO2 SERPL-SCNC: 28 MMOL/L — SIGNIFICANT CHANGE UP (ref 22–31)
CREAT SERPL-MCNC: 0.72 MG/DL — SIGNIFICANT CHANGE UP (ref 0.5–1.3)
EOSINOPHIL # BLD AUTO: 0.02 K/UL — SIGNIFICANT CHANGE UP (ref 0–0.5)
EOSINOPHIL NFR BLD AUTO: 0.3 % — SIGNIFICANT CHANGE UP (ref 0–6)
GLUCOSE SERPL-MCNC: 99 MG/DL — SIGNIFICANT CHANGE UP (ref 70–99)
HCT VFR BLD CALC: 38.4 % — SIGNIFICANT CHANGE UP (ref 34.5–45)
HGB BLD-MCNC: 11.8 G/DL — SIGNIFICANT CHANGE UP (ref 11.5–15.5)
IMM GRANULOCYTES NFR BLD AUTO: 0.1 % — SIGNIFICANT CHANGE UP (ref 0–1.5)
INR BLD: 1.52 — HIGH (ref 0.88–1.17)
LYMPHOCYTES # BLD AUTO: 1.2 K/UL — SIGNIFICANT CHANGE UP (ref 1–3.3)
LYMPHOCYTES # BLD AUTO: 17.5 % — SIGNIFICANT CHANGE UP (ref 13–44)
MCHC RBC-ENTMCNC: 23.3 PG — LOW (ref 27–34)
MCHC RBC-ENTMCNC: 30.7 % — LOW (ref 32–36)
MCV RBC AUTO: 75.9 FL — LOW (ref 80–100)
MONOCYTES # BLD AUTO: 0.55 K/UL — SIGNIFICANT CHANGE UP (ref 0–0.9)
MONOCYTES NFR BLD AUTO: 8 % — SIGNIFICANT CHANGE UP (ref 2–14)
NEUTROPHILS # BLD AUTO: 5.03 K/UL — SIGNIFICANT CHANGE UP (ref 1.8–7.4)
NEUTROPHILS NFR BLD AUTO: 73.4 % — SIGNIFICANT CHANGE UP (ref 43–77)
NRBC # FLD: 0 K/UL — LOW (ref 25–125)
PLATELET # BLD AUTO: 203 K/UL — SIGNIFICANT CHANGE UP (ref 150–400)
PMV BLD: 13.4 FL — HIGH (ref 7–13)
POTASSIUM SERPL-MCNC: 3.8 MMOL/L — SIGNIFICANT CHANGE UP (ref 3.5–5.3)
POTASSIUM SERPL-SCNC: 3.8 MMOL/L — SIGNIFICANT CHANGE UP (ref 3.5–5.3)
PROT SERPL-MCNC: 7.4 G/DL — SIGNIFICANT CHANGE UP (ref 6–8.3)
PROTHROM AB SERPL-ACNC: 17.1 SEC — HIGH (ref 9.8–13.1)
RBC # BLD: 5.06 M/UL — SIGNIFICANT CHANGE UP (ref 3.8–5.2)
RBC # FLD: 16.9 % — HIGH (ref 10.3–14.5)
SODIUM SERPL-SCNC: 138 MMOL/L — SIGNIFICANT CHANGE UP (ref 135–145)
TROPONIN T, HIGH SENSITIVITY: < 6 NG/L — SIGNIFICANT CHANGE UP (ref ?–14)
TSH SERPL-MCNC: 0.69 UIU/ML — SIGNIFICANT CHANGE UP (ref 0.27–4.2)
WBC # BLD: 6.86 K/UL — SIGNIFICANT CHANGE UP (ref 3.8–10.5)
WBC # FLD AUTO: 6.86 K/UL — SIGNIFICANT CHANGE UP (ref 3.8–10.5)

## 2019-02-13 PROCEDURE — 99285 EMERGENCY DEPT VISIT HI MDM: CPT | Mod: 25

## 2019-02-13 PROCEDURE — 93010 ELECTROCARDIOGRAM REPORT: CPT

## 2019-02-13 RX ORDER — FAMOTIDINE 10 MG/ML
20 INJECTION INTRAVENOUS ONCE
Qty: 0 | Refills: 0 | Status: COMPLETED | OUTPATIENT
Start: 2019-02-13 | End: 2019-02-13

## 2019-02-13 RX ADMIN — FAMOTIDINE 20 MILLIGRAM(S): 10 INJECTION INTRAVENOUS at 08:56

## 2019-02-13 NOTE — ED PROVIDER NOTE - PROGRESS NOTE DETAILS
RUBEN Conklin: Spoke with pts cardiologist Dr.R Colunga, discussed normal lab results and NSR on tele monitor throughout duration of visit as well as normal vital signs. He recommends the pt follow up with him and EP  on Tuesday 2/19. Discussed plan with pt who agrees and is feeling well and will return with any concerning symptoms. Discussed discharge with

## 2019-02-13 NOTE — ED ADULT NURSE NOTE - NSIMPLEMENTINTERV_GEN_ALL_ED
Implemented All Universal Safety Interventions:  Snow to call system. Call bell, personal items and telephone within reach. Instruct patient to call for assistance. Room bathroom lighting operational. Non-slip footwear when patient is off stretcher. Physically safe environment: no spills, clutter or unnecessary equipment. Stretcher in lowest position, wheels locked, appropriate side rails in place.

## 2019-02-13 NOTE — ED PROVIDER NOTE - CLINICAL SUMMARY MEDICAL DECISION MAKING FREE TEXT BOX
68yF w/pmhx PAF on eliquis, bordeline DM (not on meds) p/w palpitations and epigastric burning x yesterday. Will contact pts cardiologist, EKG with sinus rhythm, few PVCs on monitor. Will check cbc/cmp, coags, trop and TSH. Will give pepcid and reassess. Epigastric pain likely gastritis, will discuss cards recommendation for possible outpt holter.

## 2019-02-13 NOTE — ED ADULT NURSE NOTE - OBJECTIVE STATEMENT
Pt received to room 14. Presents alert and oriented to person, place, and time w/ c/o intermittent palpitations and elevated blood pressure since yesterday evening at 6pm. Pt rpts history of paroxysmal afib in which she takes Eliquis for. Made cardiologist aware of the symptoms last night. Pt denies chest pain, nausea, or vomiting. NSR on cardiac monitor at present. L AC 20 guage IV placed. Labs sent. Report given to Primary RN. Will continue to monitor.

## 2019-02-13 NOTE — ED ADULT NURSE REASSESSMENT NOTE - NS ED NURSE REASSESS COMMENT FT1
Report received from Jerica POZO. Pt is A&Ox3, appears to be comfortable. Pt denies chest pain, epigastric pain, dyspnea, palpitations, chills, fever, N/V/D. Respirations even and unlabored, lung sounds clear upon ausculation, heart sounds normal, sinus bradycardia on cardiac monitor, bowel sounds present, no tenderness in abd, abd soft and non-distended. VSS.

## 2019-02-13 NOTE — ED PROVIDER NOTE - OBJECTIVE STATEMENT
68yF w/pmhx PAF on eliquis, borderline DM (not on meds) presenting with palpitations and epigastric discomfort. Pt states for the past few days she has felt intermittent epigastric burning associated with intermittent nausea, she was started on an antacid by her PMD. Pt states last night she felt palpitations and took her blood pressure which was elevated with increased heart rate. She called her cardiologist  who instructed her to monitor her symptoms, pt states last night she was feeling better but was woken at 3am with palpitations. She was instructed to come to the ER by her cardiologist. Pt denies chest pain, shortness of breath, near syncope, vomiting, diarrhea, headache, dizziness, back pain, numbness/tingling, weakness, recent travel or illness or any other concerns.

## 2019-02-13 NOTE — ED PROVIDER NOTE - NSFOLLOWUPINSTRUCTIONS_ED_ALL_ED_FT
Follow up with your cardiologist  and electrophysiologist (EP)  on Tuesday 2/19, call the office to make an appointment  Follow up with your primary care provider within 48 hours  Continue taking home medications as prescribed to you  Return to the ER with any worsening or concerning symptoms, chest pain, shortness of breath, palpitations, elevated heart rate, feeling as though you may pass out or any other concerns.

## 2019-03-02 ENCOUNTER — EMERGENCY (EMERGENCY)
Facility: HOSPITAL | Age: 69
LOS: 1 days | Discharge: HOME | End: 2019-03-02
Admitting: EMERGENCY MEDICINE
Payer: MEDICARE

## 2019-03-02 VITALS
HEART RATE: 96 BPM | RESPIRATION RATE: 16 BRPM | SYSTOLIC BLOOD PRESSURE: 121 MMHG | DIASTOLIC BLOOD PRESSURE: 72 MMHG | OXYGEN SATURATION: 100 % | TEMPERATURE: 98 F

## 2019-03-02 DIAGNOSIS — Z90.710 ACQUIRED ABSENCE OF BOTH CERVIX AND UTERUS: Chronic | ICD-10-CM

## 2019-03-02 PROCEDURE — 71046 X-RAY EXAM CHEST 2 VIEWS: CPT | Mod: 26

## 2019-03-02 PROCEDURE — 99284 EMERGENCY DEPT VISIT MOD MDM: CPT

## 2019-03-02 RX ORDER — IPRATROPIUM/ALBUTEROL SULFATE 18-103MCG
3 AEROSOL WITH ADAPTER (GRAM) INHALATION ONCE
Qty: 0 | Refills: 0 | Status: COMPLETED | OUTPATIENT
Start: 2019-03-02 | End: 2019-03-02

## 2019-03-02 RX ADMIN — Medication 3 MILLILITER(S): at 13:53

## 2019-03-02 RX ADMIN — Medication 3 MILLILITER(S): at 14:36

## 2019-03-02 NOTE — ED PROVIDER NOTE - OBJECTIVE STATEMENT
69 y/o female hx paroxysmal afib presents to ED c/o cough/uri symptoms x 3 days. Pt. states over past few days experiencing some nasal congestion sore throat and intermittent cough - went to PMD yesterday and given mdi and augmentin which she took one dose of last night but today still had cough and came to ed for eval. Pt. states cough mostly dry but slight mucus production at times. also c/o intermittent fevers at home as well - states tmax 101F last night. took tylenol this morning but afebrile now. Denies nt sweats cp sob palpitations nausea vomit.

## 2019-03-02 NOTE — ED ADULT TRIAGE NOTE - CHIEF COMPLAINT QUOTE
Pt w/ hx of AFIB c/o cough sore throat productive of clear & yellow phlegm w/associated fever earlier in the week.

## 2019-03-02 NOTE — ED PROVIDER NOTE - CLINICAL SUMMARY MEDICAL DECISION MAKING FREE TEXT BOX
69 y/o hx afib presents to ED c/o cough/uri symptoms  -vital signs stable, patient non-toxic appearing resting comfortably, r/o PNA  -nebs, cxr  -pmd follow up

## 2019-04-02 ENCOUNTER — APPOINTMENT (OUTPATIENT)
Dept: OTOLARYNGOLOGY | Facility: CLINIC | Age: 69
End: 2019-04-02
Payer: MEDICARE

## 2019-04-02 VITALS
BODY MASS INDEX: 28.86 KG/M2 | HEIGHT: 60 IN | DIASTOLIC BLOOD PRESSURE: 71 MMHG | WEIGHT: 147 LBS | HEART RATE: 71 BPM | SYSTOLIC BLOOD PRESSURE: 119 MMHG

## 2019-04-02 DIAGNOSIS — K21.9 GASTRO-ESOPHAGEAL REFLUX DISEASE W/OUT ESOPHAGITIS: ICD-10-CM

## 2019-04-02 PROCEDURE — 31575 DIAGNOSTIC LARYNGOSCOPY: CPT

## 2019-04-02 PROCEDURE — 99213 OFFICE O/P EST LOW 20 MIN: CPT | Mod: 25

## 2019-04-02 NOTE — REASON FOR VISIT
[Subsequent Evaluation] : a subsequent evaluation for [FreeTextEntry2] : patient states follow up for her throat, last visit 2/7/2019

## 2019-04-02 NOTE — PROCEDURE
[de-identified] : Fiberoptic Laryngoscopy (21409)\par \par Procedure performed: Fiberoptic Laryngeal Endoscopy - Diagnostic\par Pre-op/post op indication: odynophagia\par Patient was unable to cooperate with mirror. After informed verbal consent is obtained, the fiberoptic nasal endoscope   is passed via the right  nasal cavity. \par Findings: base of tongue and vallecula clear, hypopharynx normal without pooled secretions, crisp epiglottis, no evidence of laryngomalacia, bilateral vocal fold mobility full and symmetric. There was  significant arytenoids edema and  erythema seen , without  mass or lesions..\par

## 2019-04-02 NOTE — HISTORY OF PRESENT ILLNESS
[de-identified] : \par  68 year old female follow up left sinus polyp, post nasal drip. States intermittent post nasal drip for years. States occasional scratchy throat, voice hoarseness. Last visit evaluation suggested GERD and given PPI am and ranitidine night.  Spoke pipe her GI and switched to xifaxan, Still feels tickle and soreness in throat.\par \par

## 2019-04-16 ENCOUNTER — APPOINTMENT (OUTPATIENT)
Dept: OPHTHALMOLOGY | Facility: CLINIC | Age: 69
End: 2019-04-16
Payer: MEDICARE

## 2019-04-16 ENCOUNTER — NON-APPOINTMENT (OUTPATIENT)
Age: 69
End: 2019-04-16

## 2019-04-16 PROCEDURE — 92012 INTRM OPH EXAM EST PATIENT: CPT

## 2019-06-11 ENCOUNTER — RX RENEWAL (OUTPATIENT)
Age: 69
End: 2019-06-11

## 2019-06-28 ENCOUNTER — APPOINTMENT (OUTPATIENT)
Dept: ORTHOPEDIC SURGERY | Facility: CLINIC | Age: 69
End: 2019-06-28
Payer: MEDICARE

## 2019-06-28 PROCEDURE — 99214 OFFICE O/P EST MOD 30 MIN: CPT

## 2019-07-23 ENCOUNTER — RX RENEWAL (OUTPATIENT)
Age: 69
End: 2019-07-23

## 2019-08-06 ENCOUNTER — APPOINTMENT (OUTPATIENT)
Dept: ENDOCRINOLOGY | Facility: CLINIC | Age: 69
End: 2019-08-06

## 2019-08-07 ENCOUNTER — APPOINTMENT (OUTPATIENT)
Dept: ENDOCRINOLOGY | Facility: CLINIC | Age: 69
End: 2019-08-07
Payer: MEDICARE

## 2019-08-07 VITALS
DIASTOLIC BLOOD PRESSURE: 68 MMHG | RESPIRATION RATE: 18 BRPM | OXYGEN SATURATION: 97 % | SYSTOLIC BLOOD PRESSURE: 118 MMHG | HEART RATE: 50 BPM

## 2019-08-07 PROCEDURE — 96401 CHEMO ANTI-NEOPL SQ/IM: CPT

## 2019-08-07 PROCEDURE — 99212 OFFICE O/P EST SF 10 MIN: CPT | Mod: 25

## 2019-08-07 RX ORDER — DENOSUMAB 60 MG/ML
60 INJECTION SUBCUTANEOUS
Qty: 1 | Refills: 0 | Status: COMPLETED | OUTPATIENT
Start: 2019-08-07

## 2019-08-07 RX ADMIN — DENOSUMAB 0 MG/ML: 60 INJECTION SUBCUTANEOUS at 00:00

## 2019-09-11 ENCOUNTER — APPOINTMENT (OUTPATIENT)
Dept: OPHTHALMOLOGY | Facility: CLINIC | Age: 69
End: 2019-09-11
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
## 2019-10-17 NOTE — PHYSICAL EXAM
[de-identified] : Constitutional: Well-nourished, well-developed, No acute distress\par Respiratory:  Good respiratory effort, no SOB\par Lymphatic: No regional lymphadenopathy, no lymphedema\par Psychiatric: Pleasant and normal affect, alert and oriented x3\par Skin: Clean dry and intact B/L UE/LE\par Musculoskeletal: normal except where as noted in regional exam\par \par \par Left Shoulder:\par APPEARANCE: no marked deformities, no swelling or malalignment\par POSITIVE TENDERNESS: none\par NONTENDER: supraspinatus, infraspinatus, teres minor, LH biceps, anterior and posterior capsule, AC joint\par ROM: full & painless, no scapular winging or dyskinesia present\par RESISTIVE TESTING: painless 5/5 resisted flex/ext, empty can/ER/IR, horizontal abd/add \par SPECIAL TESTS: neg Drop Arm, neg Empty Can, neg Brown/Neers, neg Grover's, neg Speeds, neg Apprehension, neg cross arm adduction, neg apley's scratch test\par Vasc: 2+ radial pulse\par Neuro: AIN, PIN, Ulnar nerve intact to motor, DTRs 2+/4 biceps, triceps, brachioradialis\par Sensation: Intact to light touch throughout\par B/L Elbows:  No asymmetry, malalignment, or swelling, Full ROM, 5/5 strength in flexion/ext, pronation/supination, Joints stable\par B/L Wrist and Hand:  No asymmetry, malalignment, or swelling, Full ROM, 5/5 strength in wrist and long finger flexion/ext, radial/ulnar deviation, Joints stable\par \par Right Shoulder:\par APPEARANCE: no marked deformities, no swelling or malalignment\par POSITIVE TENDERNESS: supraspinatus, long head biceps tendon\par NONTENDER:  infraspinatus, teres minor. biceps. anterior and posterior capsule. AC joint. \par ROM: full with mild painful arc past 60 degrees, no scapular winging or dyskinesia present\par RESISTIVE TESTING: MMT 4+/5 ER, Flexion and Empty can, 5/5 IR. painless 5/5 resisted ext, horizontal abd/add \par SPECIAL TESTS: + Brown and Neers, mildly + cross arm adduction, + Speeds, neg Grover's, neg Drop Arm, neg Apprehension. neg apley's scratch test\par Vasc: 2+ radial pulse\par Neuro: AIN, PIN, Ulnar nerve intact to motor, DTRs 2+/4 biceps, triceps, brachioradialis\par Sensation: Intact to light touch throughout\par \par

## 2019-10-17 NOTE — DISCUSSION/SUMMARY
[de-identified] : Discussed findings of today's exam and possible causes of patient's pain.  Educated patient on their most probable diagnosis of right shoulder impingement.  Reviewed possible courses of treatment, and we collaboratively decided best course of treatment at this time will include conservative management. Patient is on anticoagulation therapy, she cannot take oral NSAIDs, recommend continued use of Tylenol as needed for pain. Patient will continue her course of physical therapy. We discussed the role of a cortisone injection, patient like to defer at this time. Patient inquired about the possibility of an MRI, I advised based on clinical exam I very much doubt she has a significant, or complete rotator cuff tear. Would recommend deferral of MRI as she has very good function at this time and would not likely recommend surgical intervention until she has failed a longer course of conservative therapy.  Follow up as needed.  Patient appreciates and agrees with current plan.\par \par This note was generated using dragon medical dictation software.  A reasonable effort has been made for proofreading its contents, but typos may still remain.  If there are any questions or points of clarification needed please notify my office.

## 2019-10-17 NOTE — CONSULT LETTER
[Dear  ___] : Dear  [unfilled], [Consult Letter:] : I had the pleasure of evaluating your patient, [unfilled]. [Please see my note below.] : Please see my note below. [Sincerely,] : Sincerely, [FreeTextEntry3] : Barak Davies DO, ATC\par Primary Care Sports Medicine\par [facility]  [FreeTextEntry2] : [phone] [place], [place] [id number]

## 2019-10-18 ENCOUNTER — APPOINTMENT (OUTPATIENT)
Dept: ORTHOPEDIC SURGERY | Facility: CLINIC | Age: 69
End: 2019-10-18
Payer: MEDICARE

## 2019-10-18 DIAGNOSIS — M75.41 IMPINGEMENT SYNDROME OF RIGHT SHOULDER: ICD-10-CM

## 2019-10-18 PROCEDURE — 99213 OFFICE O/P EST LOW 20 MIN: CPT

## 2019-10-18 NOTE — PHYSICAL EXAM
[de-identified] : Constitutional: Well-nourished, well-developed, No acute distress\par Respiratory: Good respiratory effort, no SOB\par Lymphatic: No regional lymphadenopathy, no lymphedema\par Psychiatric: Pleasant and normal affect, alert and oriented x3\par Skin: Clean dry and intact B/L UE/LE\par Musculoskeletal: normal except where as noted in regional exam\par \par Right Shoulder:\par APPEARANCE: no marked deformities, no swelling or malalignment\par POSITIVE TENDERNESS: supraspinatus, long head biceps tendon\par NONTENDER: infraspinatus, teres minor. biceps. anterior and posterior capsule. AC joint. \par ROM: full with mild painful arc past 60 degrees, no scapular winging or dyskinesia present\par RESISTIVE TESTING: MMT 4+/5 ER, Flexion and Empty can, 5/5 IR. painless 5/5 resisted ext, horizontal abd/add \par SPECIAL TESTS: + Brown and Neers, mildly + cross arm adduction, + Speeds, neg Grover's, neg Drop Arm, neg Apprehension. neg apley's scratch test\par Vasc: 2+ radial pulse\par Neuro: AIN, PIN, Ulnar nerve intact to motor, DTRs 2+/4 biceps, triceps, brachioradialis\par Sensation: Intact to light touch throughout\par

## 2019-10-18 NOTE — HISTORY OF PRESENT ILLNESS
[de-identified] : Patient is here for right shoulder pain follow up. She states that she went to about 6 PT sessions. She has been taking Tylenol for pain relief. There has been no further injury. \par \par The patient's past medical history, past surgical history, medications and allergies were reviewed by me today and documented accordingly. In addition, the patient's family and social history, which were noncontributory to this visit, were reviewed also. The patient has no family history of arthritis.

## 2019-10-18 NOTE — DISCUSSION/SUMMARY
[de-identified] : Patient was seen today for followup of right shoulder pain. On clinical exam she still has full range of motion and very good strength, still do not believe she has any significant or surgical rotator cuff tear. Recommend continued conservative management. We again discussed utilization of cortisone to help decrease inflammation, but patient would like to defer at this time. Recommend trial of topical anti-inflammatory cream, as well as trial of topical Lidoderm patches. Patient will continue with home exercise program.  Follow up as needed.  Patient appreciates and agrees with current plan.\par \par This note was generated using dragon medical dictation software.  A reasonable effort has been made for proofreading its contents, but typos may still remain.  If there are any questions or points of clarification needed please notify my office.

## 2019-11-19 ENCOUNTER — APPOINTMENT (OUTPATIENT)
Dept: OPHTHALMOLOGY | Facility: CLINIC | Age: 69
End: 2019-11-19

## 2019-12-10 ENCOUNTER — APPOINTMENT (OUTPATIENT)
Dept: OPHTHALMOLOGY | Facility: CLINIC | Age: 69
End: 2019-12-10

## 2019-12-11 ENCOUNTER — NON-APPOINTMENT (OUTPATIENT)
Age: 69
End: 2019-12-11

## 2019-12-11 ENCOUNTER — APPOINTMENT (OUTPATIENT)
Dept: OPHTHALMOLOGY | Facility: CLINIC | Age: 69
End: 2019-12-11
Payer: MEDICARE

## 2019-12-11 PROCEDURE — 92015 DETERMINE REFRACTIVE STATE: CPT

## 2019-12-11 PROCEDURE — 92014 COMPRE OPH EXAM EST PT 1/>: CPT

## 2020-01-30 ENCOUNTER — APPOINTMENT (OUTPATIENT)
Dept: OTOLARYNGOLOGY | Facility: CLINIC | Age: 70
End: 2020-01-30
Payer: MEDICARE

## 2020-01-30 VITALS
SYSTOLIC BLOOD PRESSURE: 110 MMHG | WEIGHT: 144 LBS | HEIGHT: 61 IN | HEART RATE: 65 BPM | DIASTOLIC BLOOD PRESSURE: 69 MMHG | BODY MASS INDEX: 27.19 KG/M2

## 2020-01-30 DIAGNOSIS — H92.03 OTALGIA, BILATERAL: ICD-10-CM

## 2020-01-30 DIAGNOSIS — M26.609 UNSPECIFIED TEMPOROMANDIBULAR JOINT DISORDER: ICD-10-CM

## 2020-01-30 PROCEDURE — 99213 OFFICE O/P EST LOW 20 MIN: CPT | Mod: 25

## 2020-01-30 PROCEDURE — 92557 COMPREHENSIVE HEARING TEST: CPT

## 2020-01-30 PROCEDURE — 92567 TYMPANOMETRY: CPT

## 2020-01-31 NOTE — ADDENDUM
[FreeTextEntry1] : I, Ameera Inshanally, acted solely as a scribe for Dr. Everett Hand on this date, 1/30/2020\par \par All medical record entries made by the Scribe were at my Dr. Hand direction and personally dictated by me on 1/30/2020. I have reviewed the chart and agree that the record accurately reflects my personal performance of the history, physical exam, assessment and plan. I have also personally directed, reviewed and agreed with the chart.\par

## 2020-01-31 NOTE — PHYSICAL EXAM
[Normal] : orientation to person, place, and time: normal [de-identified] : tenderness on L side and on intraoral palpation, tenderness of L pterygoid muscles and minimal tenderness in L joint  [de-identified] : No infection

## 2020-01-31 NOTE — HISTORY OF PRESENT ILLNESS
[de-identified] : 69 year old female follow up here for intermittent bilateral otalgia.  States when out in the cold or in wind, she has bilateral otalgia, right worse than the left.   also when in the house has ear pain as well.  has had symptoms for the past 2 weeks. She is having pain today for the past hour.  went to PCP 2 weeks ago and was told she has a left ear infection, prescribed oral antibiotics, with good result, but then 2 days ago it started again.   also has intermittent bilateral tinnitus and "wind" sound. Had dental cleaning last week, after ear infection.  Denies otorrhea, hearing loss, dizziness, vertigo or headaches related to hearing. \par \par Audiogram from 9/22/2009 revealed

## 2020-02-21 ENCOUNTER — APPOINTMENT (OUTPATIENT)
Dept: ENDOCRINOLOGY | Facility: CLINIC | Age: 70
End: 2020-02-21
Payer: MEDICARE

## 2020-02-21 VITALS — HEIGHT: 58.9 IN | WEIGHT: 155 LBS | BODY MASS INDEX: 31.25 KG/M2

## 2020-02-21 VITALS
HEIGHT: 58.9 IN | WEIGHT: 155 LBS | SYSTOLIC BLOOD PRESSURE: 110 MMHG | HEART RATE: 54 BPM | BODY MASS INDEX: 31.25 KG/M2 | DIASTOLIC BLOOD PRESSURE: 80 MMHG | OXYGEN SATURATION: 98 %

## 2020-02-21 DIAGNOSIS — E66.9 OBESITY, UNSPECIFIED: ICD-10-CM

## 2020-02-21 PROCEDURE — 99214 OFFICE O/P EST MOD 30 MIN: CPT | Mod: 25

## 2020-02-21 PROCEDURE — ZZZZZ: CPT

## 2020-02-21 PROCEDURE — 96401 CHEMO ANTI-NEOPL SQ/IM: CPT

## 2020-02-21 PROCEDURE — 77080 DXA BONE DENSITY AXIAL: CPT

## 2020-02-21 RX ORDER — DENOSUMAB 60 MG/ML
60 INJECTION SUBCUTANEOUS
Qty: 1 | Refills: 0 | Status: COMPLETED | OUTPATIENT
Start: 2020-02-21

## 2020-02-21 RX ORDER — OMEPRAZOLE 40 MG/1
40 CAPSULE, DELAYED RELEASE ORAL
Qty: 60 | Refills: 3 | Status: DISCONTINUED | COMMUNITY
Start: 2019-02-07 | End: 2020-02-21

## 2020-02-21 RX ADMIN — DENOSUMAB 60 MG/ML: 60 INJECTION SUBCUTANEOUS at 00:00

## 2020-02-21 NOTE — PHYSICAL EXAM
[No Acute Distress] : no acute distress [Alert] : alert [Well Nourished] : well nourished [Normal Sclera/Conjunctiva] : normal sclera/conjunctiva [Well Developed] : well developed [EOMI] : extra ocular movement intact [Normal Oropharynx] : the oropharynx was normal [No Proptosis] : no proptosis [Thyroid Not Enlarged] : the thyroid was not enlarged [No Thyroid Nodules] : there were no palpable thyroid nodules [No Respiratory Distress] : no respiratory distress [No Accessory Muscle Use] : no accessory muscle use [Clear to Auscultation] : lungs were clear to auscultation bilaterally [Normal S1, S2] : normal S1 and S2 [Normal Rate] : heart rate was normal  [Regular Rhythm] : with a regular rhythm [Normal Bowel Sounds] : normal bowel sounds [Not Tender] : non-tender [Soft] : abdomen soft [Not Distended] : not distended [Anterior Cervical Nodes] : anterior cervical nodes [No Spinal Tenderness] : no spinal tenderness [Normal] : normal and non tender [Spine Straight] : spine straight [No Stigmata of Cushings Syndrome] : no stigmata of cushings syndrome [Normal Strength/Tone] : muscle strength and tone were normal [Normal Gait] : normal gait [No Rash] : no rash [Normal Reflexes] : deep tendon reflexes were 2+ and symmetric [No Tremors] : no tremors [Oriented x3] : oriented to person, place, and time

## 2020-02-21 NOTE — ASSESSMENT
[Denosumab Therapy] : Risks  and benefits of denosumab therapy were discussed with the patient including eczema, cellulitis, osteonecrosis of the jaw and atypical femur fractures [FreeTextEntry1] : f/u 70 y/o female with:\par . Osteoporosis: previously treated with Fosamax between 2006 to 2013, stopped due to UGI sx. Low BMD in Oct 2015. Began Prolia December 2015. Tolerating well. No thigh pain, no interval fx. No ONJ. BMD 12/2016 essentially stable. BMD 1/2019 indicates further improvement in lowest site, spine. All other sites essentially stable osteopenia. BMD 2/2020 indicates slightly worsened osteoporosis in spine although this maybe due to technical error, significantly improving osteopenia in hip, and stable osteopenia in proximal radius. BMD results reviewed w/ pt. Continue Prolia from [employer].\par \par  H/o subclinical hyperthyroidism:  and thyroid nodule pt is clinically euthyroid on PE. Thyroid scan showed increased uptake of 29% from 17% from 2014 to 2015. Single subcentimeter L thyroid nodule stable. No local neck pain. No dysphagia or dysphonia. No raciness, shakiness, heat/cold intolerance, tiredness, or fatigue. No palpitations, tremors, or sudden weight gain/loss.\par \par  Prediabetes: A1C stable 6.1%.   Neg microalbumin. Obesity.   Natural history of prediabetes discussed in detail. Pt advised re: watching weight, maintaining moderate to low carbohydrate intake. Controversy concerning use of metformin for prediabetes reviewed. \par \par Labs reviewed: Ca 9.2, normal. Vitamin D, normal. HgA1c 6.1%, prediabetes. , normal. TSH 0.57, normal. T4 6.5, normal.\par \par f/u in 6 months w/ repeat BMD in 1 year

## 2020-02-21 NOTE — END OF VISIT
[FreeTextEntry3] : I, Homero Martniez, authored this note working as a medical scribe for Dr. Bautista.  02/21/2020. 11:00AM. This note was authored by the medical scribe for me. I have reviewed, edited, and revised the note as needed. I am in agreement with the exam findings, imaging findings, and treatment plan.  Gerhard Bautista MD

## 2020-02-21 NOTE — REVIEW OF SYSTEMS
[Heart Rate Is Slow] : slow heart rate [Negative] : Heme/Lymph [Decreased Appetite] : appetite not decreased [Fatigue] : no fatigue [Dysphagia] : no dysphagia [Blurry Vision] : no blurred vision [Visual Field Defect] : no visual field defect [Palpitations] : no palpitations [Chest Pain] : no chest pain [Dysphonia] : no dysphonia [SOB on Exertion] : no shortness of breath during exertion [Diarrhea] : no diarrhea [Constipation] : no constipation [FreeTextEntry5] : Afib

## 2020-02-21 NOTE — PROCEDURE
[FreeTextEntry1] : Bone mineral density: 02/21/2020 \par Indication: vs. 2019 prior test showed bone loss\par Spine: -2.8 osteoporosis -4.0%, suspect edge detection analysis change, no significant change vs. 2016\par Total hip: -2.2 osteopenia +7.0%\par Femoral neck: -1.6 osteopenia +7.6%\par Proximal radius: -1.5 osteopenia, no significant change\par \par Bone mineral density: 01/28/2019 \par Indication: vs 2016\par Spine: -2.5 osteoporosis (+3.4%, +6.1% vs 2015)\par Total hip: -1.5 osteopenia, no significant change \par Femoral neck: -1.9 osteopenia (-5.0%, stable vs 2015)\par Proximal radius: -1.3 osteopenia, no significant change (+4.8% vs 2015)\par \par Thyroid ultrasound December 19, 2016\par Left hypoechoic nodule 8 x 6 x 8 mm\par \par Bone mineral density December 19, 2016\par Indication assess response to medication\par Spine -2.8, osteoporosis, no significant change\par Femoral neck -1.7, osteopenia, no significant change\par Total hip -1.5, osteopenia, no significant change\par Proximal radius -1.4, osteopenia, no significant change\par \par \par thyroid ultrasound  Sept 18, 2105\par General texture normal. Single left hypoechoic nodule suggestive of thyroiditis 7 x 7 x 8 mm\par \par bone mineral density test performed October 28, 2015\par spine -3.0, osteoporosis\par Total hip -1.6, osteopenia\par Femoral neck -1.9, osteopenia\par Proximal radius -1.7, osteopenia

## 2020-02-21 NOTE — HISTORY OF PRESENT ILLNESS
[Alendronate (Fosomax)] : Alendronate [Patient taking Meds Correctly] : Patient is taking meds correctly [Premat. Menopause (surg)] : premature menopause which occurred surgically [Prolia (Denosumab)] : Prolia (Denosumab) [Regular Dental Follow-Up] : regular dental follow-up [FreeTextEntry1] : f/u 69 year-old female with osteoporosis and thyroid disease.\par \par H/o osteoporosis for many years. Took Fosamax for 7 years approximately from 2006 until 2013, stopped due to UGI sx. BMD Oct 2015 low. Began Prolia December 2015. Tolerating well. No thigh pain, no interval fx. Last DDS within... No ONJ. BMD 2017 stable. BMD 1/2019 indicates further improvement in lowest site, spine. All other sites essentially stable osteopenia.\par \par H/o low TSH 0.35, and concern for subclinical hyperthyroidism. Pt remains asympt. She has had several TSH values been low or low normal. T4 has remained normal. Prior TSH minimally decreased at 0.37 but free T4 low normal at 1.1 total T3 95. March 2018 TSH: 0.71. Thyroid scan July 2014, normal scan, 18% uptake. PCP repeated a thyroid uptake and scan on 10/13/15 which showed normal thyroid scan and uptake of 29%; increased from previous 17.5%. no hot or cold nodules. No h/o thyroid disease, nodules, exposure to RT, fhx, exposure lithium or amiodarone.\par \par H/o trial fibrillation, on Eliquis.\par \par A1c stable at 6.1% mother DM. Pt is a vegetarian foods/vegetables. Pt limits rice to brown rice, whole wheat bread. No soda, no juice and natural juice. Patient has howard candy from time to time, no sweet tooth habits.\par \par Pt born in [place].  [Disordered Eating] : no past or present history of disordered eating [Taking Steroids] : no past or present history of taking steroids [Sedentary] : no past or present history of a sedentary lifestyle [Excessive Alcohol Intake] : no past or present history of excessive alcohol intake [Kidney Stones] : no history of kidney stones [Family History of Breast Cancer] : no family history of breast cancer [Family History of Osteoporosis] : no family history of osteoporosis [Current Smoking___(ppd)] : not currently smoking [Family History of Hip Fracture] : no family history of hip fracture [History of Radiation Therapy] : no history of radiation therapy [Hyperparathyroidism] : no hyperparathyroidism [History of Blood Clots] : no history of blood clots [de-identified] : Last dental visit 3 months ago [Previous Fragility Fracture] : no previous fragility fracture

## 2020-04-28 NOTE — H&P ADULT - NSHPSOCIALHISTORY_GEN_ALL_CORE
Patient:   BRITNEY DIAS            MRN: [id number]            FIN: [id number]              Age:   79 years     Sex:  MALE     :  42   Associated Diagnoses:   None   Author:   TIM STEWART     Subjective   Patient seen and evaluated   Denies any acute complains   Currently on 2 lpm NC  No acute events overnight reported to me     Health Status   Allergies:    Allergies (1) Active Reaction  omeprazole None Documented    Current medications: Medications (15) Active  Scheduled: (10)  Alfuzosin 10 mg ER tab  10 mg 1 tab, Oral, Daily  Dabigatran 150 mg cap  150 mg 1 cap, Oral, Q12H  DilTIAZem 180 mg CD cap  180 mg 1 cap, Oral, Daily  Ergocalciferol 50,000 unit (1.25 mg) cap  50,000 unit 1 cap, Oral, Q   Gabapentin 300 mg cap  600 mg 2 cap, Oral, Q12H  GuaiFENesin 600 mg LA tab  600 mg 1 tab, Oral, Q12H  insulin glargine  12 unit 0.12 mL, Subcutaneous, Q Bedtime  Insulin human lispro 1 unit/0.01 mL inj  3-18, Subcutaneous, QID [with meals & HS]  Pneumococcal adult vaccine 23-polyvalent 0.5 mL IM inj SDV  0.5 mL, IM, On Call  Pravastatin 40 mg tab  40 mg 1 tab, Oral, Q Bedtime  Continuous: (0)  PRN: (5)  Acetaminophen 325 mg tab  650 mg 2 tab, Oral, Q4H  Dextrose (glucose) 40% 15 gm/37.5 gm oral gel UD  15 gm, Oral, As Directed PRN  Dextrose (glucose) 50% 25 gm/50 mL syringe  12.5 gm 25 mL, IV Push, As Directed PRN  Glucagon 1 mg/1 mL emergency kit SDV  1 mg 1 mL, IM, As Directed PRN  TraMADol 50 mg tab  25 mg 0.5 tab, Oral, TID      Problem list: Diabetes mellitus  HTN - Hypertension  Hyperlipidaemia  Risk factors for obstructive sleep apnea        Objective   VS/Measurements     Vitals between:   2020 13:54:43   TO   2020 13:54:43                   LAST RESULT MINIMUM MAXIMUM  Temperature 36.6 36.6 36.8  Heart Rate 128 58 128  Respiratory Rate 18 16 18  NISBP           134 108 134  NIDBP           87 62 87  NIMBP           77 77 77  SpO2                    95 94  96    HEENT:MEHDI  HEART::S1S2  LUNGS:occ basal crackles  P:A:Soft NTNDBS+  EXT: edema +ve        Review / Management   Results review:     Labs between:  27-APR-2020 13:54 to 28-APR-2020 13:54  CBC:                 WBC  HgB  Hct  Plt  MCV  RDW   28-APR-2020 11.0  (L) 12.5  41.5  178  93.5  (H) 15.2   BMP:                 Na  Cl  BUN  Glu   28-APR-2020 (H) 149  (H) 109  (H) 22  (H) 133                              K  CO2  Cr  Ca                              3.7  (H) 36  0.73  8.9   Other Chem:             Mg  Phos  Triglycerides  GGTP  DirectBili                           1.7           POC GLU:                 Latest Result  Latest Date  Minimum  Min Date  Maximum  Max Date                             (H) 154  28-APR-2020 (H) 154  28-APR-2020 (H) 157  27-APR-2020                 .    Chest Tubes  No currently active chest tubes have been documented on in the previous 24 hours.  Hemodynamics   Date Range: 04/27/20 13:54 to 04/28/20 13:54  No hemodynamic data found over the previous 24 hours.   I & O between:  27-APR-2020 13:54 TO 28-APR-2020 13:54  Med Dosing Weight:  84.1  kg   22-APR-2020  24 Hour Intake:   727.50  ( 8.65 mL/kg )  24 Hour Output:   3050.00           24 Hour Urine/Stool Output:   0.0  24 Hour Balance:   -2322.50           24 Hour Urine Output:   3050.00  ( 1.51 mL/kg/hr )                    Stool Count:  3.00     NO VENTILATORS QUALIFIED        Impression and Plan   79-year-old gentleman with past medical history significant for diabetes mellitus, hypertension, hyperlipidemia, CKD presented to the emergency room from nursing home secondary to hypotension, altered mental status.  Assessment  Septic shock, likely secondary to UTI  Hypotension  Hyperlipidemia  History of atrial fibrillation  Mild hypoxia  Acute intermittent encephalopathy  CASANDRA on CKD  Uremia  Hyperkalemia  Anion gap metabolic acidosis now resolved  Plan  Sepsis, septic shock likely secondary to UTI.  Currently on broad-spectrum  antibiotics.  Weaned off pressors  CXR 4/26 reviewed. Stable aeration  No acute infiltrate noted  BNP only mildly elevated  VQ scan only done as perfusion scan. matched defect inl brayan bases. read as uninterpretable/limited.  Patient currently on pradaxa  Low pretest prob for PE. SOB which is now improving suspect related ot aspiration pneumoniis  Will request speech to reevaluate patient  Denies any SOB, or pain  Recommend continuous pulse oximetry.  titrate FiO2 to keep sats 90-96%  Monitor fluid status.  CT head negative for acute bleed.  Mental status improving.  A. fib on Lovenox.  Nephrology following  Attempt tight glycemic control  DVT prophylaxis on Pradaxa  Discussed with patients nurse  Discussed with Dr Wallace  Date of service: 04/28/20   Single, lives alone, retired phlebotomist   Never smoke/drank/or used any illicit drugs

## 2020-05-12 NOTE — DISCHARGE NOTE ADULT - PHYSICIAN SECTION COMPLETE
Patient called regarding update from EKG result:  Spoke with patient regarding EKG,   Advised no written results noted in chart, patient states she wants to get the \"OK\" to start the medication.    Advised to patient provider will be in tomorrow and this Yes

## 2020-05-21 NOTE — ED PROVIDER NOTE - OTHER FINDINGS
no [facility] Elidel Counseling: Patient may experience a mild burning sensation during topical application. Elidel is not approved in children less than 2 years of age. There have been case reports of hematologic and skin malignancies in patients using topical calcineurin inhibitors although causality is questionable.

## 2020-08-06 ENCOUNTER — RX RENEWAL (OUTPATIENT)
Age: 70
End: 2020-08-06

## 2020-08-26 ENCOUNTER — APPOINTMENT (OUTPATIENT)
Dept: ENDOCRINOLOGY | Facility: CLINIC | Age: 70
End: 2020-08-26
Payer: MEDICARE

## 2020-08-26 VITALS
HEIGHT: 58 IN | TEMPERATURE: 97.6 F | SYSTOLIC BLOOD PRESSURE: 120 MMHG | DIASTOLIC BLOOD PRESSURE: 82 MMHG | BODY MASS INDEX: 33.8 KG/M2 | WEIGHT: 161 LBS | HEART RATE: 47 BPM | OXYGEN SATURATION: 97 %

## 2020-08-26 DIAGNOSIS — E05.90 THYROTOXICOSIS, UNSPECIFIED W/OUT THYROTOXIC CRISIS OR STORM: ICD-10-CM

## 2020-08-26 PROCEDURE — 99214 OFFICE O/P EST MOD 30 MIN: CPT | Mod: 25

## 2020-08-26 PROCEDURE — 96401 CHEMO ANTI-NEOPL SQ/IM: CPT

## 2020-08-26 PROCEDURE — 77080 DXA BONE DENSITY AXIAL: CPT

## 2020-08-26 RX ORDER — DENOSUMAB 60 MG/ML
60 INJECTION SUBCUTANEOUS
Qty: 1 | Refills: 0 | Status: COMPLETED | OUTPATIENT
Start: 2020-08-26

## 2020-08-26 RX ADMIN — DENOSUMAB 60 MG/ML: 60 INJECTION SUBCUTANEOUS at 00:00

## 2020-08-27 RX ORDER — RANITIDINE 300 MG/1
300 TABLET ORAL
Qty: 60 | Refills: 1 | Status: DISCONTINUED | COMMUNITY
Start: 2019-02-07 | End: 2020-08-27

## 2020-08-27 NOTE — HISTORY OF PRESENT ILLNESS
[Alendronate (Fosomax)] : Alendronate [Patient taking Meds Correctly] : Patient is taking meds correctly [Prolia (Denosumab)] : Prolia (Denosumab) [Premat. Menopause (surg)] : premature menopause which occurred surgically [Regular Dental Follow-Up] : regular dental follow-up [FreeTextEntry1] :  \par H/o osteoporosis for many years. Took Fosamax for 7 years approximately from 2006 until 2013, stopped due to UGI sx. BMD Oct 2015 low. Began Prolia December 2015. Tolerating well. No thigh pain, no interval fx. Last DDS within... No ONJ. BMD 2017 stable. BMD 1/2019 indicates further improvement in lowest site, spine. All other sites essentially stable osteopenia.\par \par H/o low TSH 0.35, and concern for subclinical hyperthyroidism. Pt remains asympt. She has had several TSH values been low or low normal. T4 has remained normal. Prior TSH minimally decreased at 0.37 but free T4 low normal at 1.1 total T3 95. March 2018 TSH: 0.71. Thyroid scan July 2014, normal scan, 18% uptake. PCP repeated a thyroid uptake and scan on 10/13/15 which showed normal thyroid scan and uptake of 29%; increased from previous 17.5%. no hot or cold nodules. No h/o thyroid disease, nodules, exposure to RT, fhx, exposure lithium or amiodarone.\par \par H/o trial fibrillation, on Eliquis.\par \par A1c stable at 6.1% mother DM. Pt is a vegetarian foods/vegetables. Pt limits rice to brown rice, whole wheat bread. No soda, no juice and natural juice. Patient has howard candy from time to time, no sweet tooth habits.\par \par Pt born in [place].  [Kidney Stones] : no history of kidney stones [Taking Steroids] : no past or present history of taking steroids [Disordered Eating] : no past or present history of disordered eating [Excessive Alcohol Intake] : no past or present history of excessive alcohol intake [Current Smoking___(ppd)] : not currently smoking [Sedentary] : no past or present history of a sedentary lifestyle [Family History of Breast Cancer] : no family history of breast cancer [Family History of Osteoporosis] : no family history of osteoporosis [Family History of Hip Fracture] : no family history of hip fracture [Hyperparathyroidism] : no hyperparathyroidism [History of Radiation Therapy] : no history of radiation therapy [History of Blood Clots] : no history of blood clots [Previous Fragility Fracture] : no previous fragility fracture [de-identified] : Last dental visit 3 months ago

## 2020-08-27 NOTE — ASSESSMENT
[Denosumab Therapy] : Risks  and benefits of denosumab therapy were discussed with the patient including eczema, cellulitis, osteonecrosis of the jaw and atypical femur fractures [FreeTextEntry1] : f/u 69 y/o female with:\par . Osteoporosis: previously treated with Fosamax between 2006 to 2013, stopped due to UGI sx. Low BMD in Oct 2015. Began Prolia December 2015. Tolerating well. No thigh pain, no interval fx. No ONJ. BMD 12/2016 essentially stable. BMD 1/2019 indicates further improvement in lowest site, spine. All other sites essentially stable osteopenia. BMD 2/2020 indicates slightly worsened osteoporosis in spine although this maybe due to technical error, significantly improving osteopenia in hip, and stable osteopenia in proximal radius. BMD results reviewed w/ pt. Continue Prolia from [employer].\par repeat BMD next visit \par  H/o subclinical hyperthyroidism:  and thyroid nodule pt is clinically euthyroid on PE. Thyroid scan showed increased uptake of 29% from 17% from 2014 to 2015. Single subcentimeter L thyroid nodule stable. No local neck pain. No dysphagia or dysphonia. No raciness, shakiness, heat/cold intolerance, tiredness, or fatigue. No palpitations, tremors, or sudden weight gain/loss.\par had labs with Dr Pack\par  Prediabetes: A1C stable 6.1%.   Neg microalbumin. Obesity.   Natural history of prediabetes discussed in detail. Pt advised re: watching weight, maintaining moderate to low carbohydrate intake. Controversy concerning use of metformin for prediabetes reviewed. \par \par Labs reviewed: Ca 9.2, normal. Vitamin D, normal. HgA1c 6.1%, prediabetes. , normal. TSH 0.57, normal. T4 6.5, normal.\par \par f/u in 6 months w/ repeat BMD

## 2020-08-27 NOTE — PHYSICAL EXAM
[Alert] : alert [Well Nourished] : well nourished [No Acute Distress] : no acute distress [Well Developed] : well developed [Normal Sclera/Conjunctiva] : normal sclera/conjunctiva [EOMI] : extra ocular movement intact [No Proptosis] : no proptosis [Normal Oropharynx] : the oropharynx was normal [No Thyroid Nodules] : no palpable thyroid nodules [Thyroid Not Enlarged] : the thyroid was not enlarged [No Respiratory Distress] : no respiratory distress [Normal S1, S2] : normal S1 and S2 [No Accessory Muscle Use] : no accessory muscle use [Clear to Auscultation] : lungs were clear to auscultation bilaterally [Normal Rate] : heart rate was normal [Regular Rhythm] : with a regular rhythm [No Edema] : no peripheral edema [Normal Bowel Sounds] : normal bowel sounds [Not Tender] : non-tender [Soft] : abdomen soft [Not Distended] : not distended [No Spinal Tenderness] : no spinal tenderness [Normal Anterior Cervical Nodes] : no anterior cervical lymphadenopathy [Normal Posterior Cervical Nodes] : no posterior cervical lymphadenopathy [Spine Straight] : spine straight [No Stigmata of Cushings Syndrome] : no stigmata of Cushings Syndrome [Normal Gait] : normal gait [Normal Strength/Tone] : muscle strength and tone were normal [No Rash] : no rash [Acanthosis Nigricans] : no acanthosis nigricans [Normal Reflexes] : deep tendon reflexes were 2+ and symmetric [Oriented x3] : oriented to person, place, and time [No Tremors] : no tremors

## 2020-11-19 ENCOUNTER — APPOINTMENT (OUTPATIENT)
Dept: OTOLARYNGOLOGY | Facility: CLINIC | Age: 70
End: 2020-11-19
Payer: MEDICARE

## 2020-11-19 VITALS
HEART RATE: 51 BPM | WEIGHT: 161 LBS | DIASTOLIC BLOOD PRESSURE: 66 MMHG | SYSTOLIC BLOOD PRESSURE: 110 MMHG | BODY MASS INDEX: 33.8 KG/M2 | HEIGHT: 58 IN

## 2020-11-19 DIAGNOSIS — M26.609 UNSPECIFIED TEMPOROMANDIBULAR JOINT DISORDER: ICD-10-CM

## 2020-11-19 PROCEDURE — 99213 OFFICE O/P EST LOW 20 MIN: CPT

## 2020-11-19 NOTE — HISTORY OF PRESENT ILLNESS
[de-identified] : 70 year old female follow up here for pain on both sides of the face, in front of the ears, for the past couple of months.   went to the dentist as she thought it was related to teeth.   was told to follow up with an ENT.  Seen previously with the same symptoms and was referred to dentist for a nightguard for TMJ.  Apparently her dentist does not fashioned nightguard's and sent the back to the office.

## 2020-11-19 NOTE — PHYSICAL EXAM
[de-identified] : pain in tmj bilateral [Nasal Endoscopy Performed] : nasal endoscopy was performed, see procedure section for findings [Normal] : cardiovascular peripheral examination is normal

## 2020-11-19 NOTE — REASON FOR VISIT
[Subsequent Evaluation] : a subsequent evaluation for [FreeTextEntry2] : follow up here for pain on both sides of the face, in front of the ears

## 2020-12-01 ENCOUNTER — APPOINTMENT (OUTPATIENT)
Dept: VASCULAR SURGERY | Facility: CLINIC | Age: 70
End: 2020-12-01
Payer: MEDICARE

## 2020-12-01 VITALS
SYSTOLIC BLOOD PRESSURE: 116 MMHG | WEIGHT: 146 LBS | TEMPERATURE: 97.6 F | HEART RATE: 69 BPM | BODY MASS INDEX: 27.56 KG/M2 | HEIGHT: 61 IN | DIASTOLIC BLOOD PRESSURE: 78 MMHG

## 2020-12-01 DIAGNOSIS — M25.572 PAIN IN LEFT ANKLE AND JOINTS OF LEFT FOOT: ICD-10-CM

## 2020-12-01 PROCEDURE — 99202 OFFICE O/P NEW SF 15 MIN: CPT

## 2020-12-01 PROCEDURE — 99072 ADDL SUPL MATRL&STAF TM PHE: CPT

## 2020-12-01 NOTE — PHYSICAL EXAM
[JVD] : no jugular venous distention  [Normal Breath Sounds] : Normal breath sounds [Normal Heart Sounds] : normal heart sounds [2+] : left 2+ [Ankle Swelling (On Exam)] : present [Ankle Swelling On The Left] : of the left ankle [Ankle Swelling On The Right] : mild [Varicose Veins Of Lower Extremities] : present [] : not present [FreeTextEntry1] : no real appreciated swelling

## 2020-12-01 NOTE — ASSESSMENT
[FreeTextEntry1] : Unlikely to have venous pathology for point tenderness. Even if the patient has venous insufficiency, I would not think that the mild ankle edema would be helped with ablation. I think compression therapy is a good start.

## 2020-12-01 NOTE — HISTORY OF PRESENT ILLNESS
[FreeTextEntry1] : A 70 year old woman with left ankle point tenderness over the medial side of her ankle and mild swelling. no pain or cramps as the day goes on and she has no history of venous disease.

## 2020-12-08 ENCOUNTER — APPOINTMENT (OUTPATIENT)
Dept: OPHTHALMOLOGY | Facility: CLINIC | Age: 70
End: 2020-12-08
Payer: MEDICARE

## 2020-12-08 ENCOUNTER — NON-APPOINTMENT (OUTPATIENT)
Age: 70
End: 2020-12-08

## 2020-12-08 PROCEDURE — 99072 ADDL SUPL MATRL&STAF TM PHE: CPT

## 2020-12-08 PROCEDURE — 92014 COMPRE OPH EXAM EST PT 1/>: CPT

## 2021-02-05 ENCOUNTER — APPOINTMENT (OUTPATIENT)
Dept: VASCULAR SURGERY | Facility: CLINIC | Age: 71
End: 2021-02-05

## 2021-03-03 ENCOUNTER — NON-APPOINTMENT (OUTPATIENT)
Age: 71
End: 2021-03-03

## 2021-03-09 ENCOUNTER — APPOINTMENT (OUTPATIENT)
Dept: VASCULAR SURGERY | Facility: CLINIC | Age: 71
End: 2021-03-09
Payer: MEDICARE

## 2021-03-09 VITALS
HEIGHT: 61 IN | DIASTOLIC BLOOD PRESSURE: 75 MMHG | SYSTOLIC BLOOD PRESSURE: 118 MMHG | TEMPERATURE: 97.3 F | WEIGHT: 145 LBS | HEART RATE: 53 BPM | BODY MASS INDEX: 27.38 KG/M2

## 2021-03-09 PROCEDURE — 93970 EXTREMITY STUDY: CPT

## 2021-03-09 PROCEDURE — 99211 OFF/OP EST MAY X REQ PHY/QHP: CPT

## 2021-03-09 PROCEDURE — 99072 ADDL SUPL MATRL&STAF TM PHE: CPT

## 2021-03-09 NOTE — HISTORY OF PRESENT ILLNESS
[FreeTextEntry1] : A 60 year old woman who I have seen before for the same problem of left ankle spot tenderness. She also has occasional leg swelling but not really bothering her.  [de-identified] : She came back to get an ultrasound. I explained again that spot ankle pain, tenderness or swelling is not really a venous insufficiency\par  symptoms  and that it would unlikely be helped with venous procedures.

## 2021-03-09 NOTE — PHYSICAL EXAM
[Ankle Swelling (On Exam)] : not present [Varicose Veins Of Lower Extremities] : not present [] : not present

## 2021-03-09 NOTE — ASSESSMENT
[FreeTextEntry1] : 70 year old with left ankle pain and mild occasional swelling. Venous duplex demonstrated totally normal veins with no evidence of VI or DVT.

## 2021-03-10 ENCOUNTER — APPOINTMENT (OUTPATIENT)
Dept: ENDOCRINOLOGY | Facility: CLINIC | Age: 71
End: 2021-03-10
Payer: MEDICARE

## 2021-03-10 VITALS
TEMPERATURE: 97.2 F | DIASTOLIC BLOOD PRESSURE: 62 MMHG | OXYGEN SATURATION: 99 % | SYSTOLIC BLOOD PRESSURE: 104 MMHG | HEIGHT: 58.9 IN | BODY MASS INDEX: 32.25 KG/M2 | WEIGHT: 160 LBS | HEART RATE: 63 BPM

## 2021-03-10 PROCEDURE — 99214 OFFICE O/P EST MOD 30 MIN: CPT | Mod: 25

## 2021-03-10 PROCEDURE — 99072 ADDL SUPL MATRL&STAF TM PHE: CPT

## 2021-03-10 PROCEDURE — ZZZZZ: CPT

## 2021-03-10 PROCEDURE — 96401 CHEMO ANTI-NEOPL SQ/IM: CPT

## 2021-03-10 PROCEDURE — 77080 DXA BONE DENSITY AXIAL: CPT

## 2021-03-10 RX ORDER — DENOSUMAB 60 MG/ML
60 INJECTION SUBCUTANEOUS
Qty: 1 | Refills: 0 | Status: COMPLETED | OUTPATIENT
Start: 2021-03-10

## 2021-05-04 NOTE — ED PROVIDER NOTE - CPE EDP SKIN NORM
Problem: Pressure Injury - Risk of  Goal: *Prevention of pressure injury  Description: Document Dejuan Scale and appropriate interventions in the flowsheet.   Outcome: Progressing Towards Goal  Note: Pressure Injury Interventions:  Sensory Interventions: Keep linens dry and wrinkle-free    Moisture Interventions: Absorbent underpads, Apply protective barrier, creams and emollients    Activity Interventions: Increase time out of bed    Mobility Interventions: HOB 30 degrees or less    Nutrition Interventions: Document food/fluid/supplement intake, Offer support with meals,snacks and hydration    Friction and Shear Interventions: Apply protective barrier, creams and emollients, HOB 30 degrees or less normal...

## 2021-08-01 ENCOUNTER — EMERGENCY (EMERGENCY)
Facility: HOSPITAL | Age: 71
LOS: 1 days | Discharge: HOME | End: 2021-08-01
Attending: EMERGENCY MEDICINE
Payer: MEDICARE

## 2021-08-01 VITALS
RESPIRATION RATE: 20 BRPM | TEMPERATURE: 98 F | HEIGHT: 60 IN | OXYGEN SATURATION: 100 % | HEART RATE: 64 BPM | SYSTOLIC BLOOD PRESSURE: 134 MMHG | WEIGHT: 145.95 LBS | DIASTOLIC BLOOD PRESSURE: 80 MMHG

## 2021-08-01 DIAGNOSIS — Z90.710 ACQUIRED ABSENCE OF BOTH CERVIX AND UTERUS: Chronic | ICD-10-CM

## 2021-08-01 PROCEDURE — 93010 ELECTROCARDIOGRAM REPORT: CPT

## 2021-08-01 PROCEDURE — 99285 EMERGENCY DEPT VISIT HI MDM: CPT | Mod: 25

## 2021-08-02 VITALS
HEART RATE: 52 BPM | RESPIRATION RATE: 16 BRPM | OXYGEN SATURATION: 98 % | DIASTOLIC BLOOD PRESSURE: 74 MMHG | SYSTOLIC BLOOD PRESSURE: 115 MMHG | TEMPERATURE: 98 F

## 2021-08-02 LAB
ALBUMIN SERPL ELPH-MCNC: 3.9 G/DL — SIGNIFICANT CHANGE UP (ref 3.3–5)
ALP SERPL-CCNC: 40 U/L — SIGNIFICANT CHANGE UP (ref 40–120)
ALT FLD-CCNC: 13 U/L — SIGNIFICANT CHANGE UP (ref 10–45)
ANION GAP SERPL CALC-SCNC: 10 MMOL/L — SIGNIFICANT CHANGE UP (ref 5–17)
AST SERPL-CCNC: 14 U/L — SIGNIFICANT CHANGE UP (ref 10–40)
BASOPHILS # BLD AUTO: 0.11 K/UL — SIGNIFICANT CHANGE UP (ref 0–0.2)
BASOPHILS NFR BLD AUTO: 1.8 % — SIGNIFICANT CHANGE UP (ref 0–2)
BILIRUB SERPL-MCNC: 0.2 MG/DL — SIGNIFICANT CHANGE UP (ref 0.2–1.2)
BUN SERPL-MCNC: 10 MG/DL — SIGNIFICANT CHANGE UP (ref 7–23)
CALCIUM SERPL-MCNC: 9.9 MG/DL — SIGNIFICANT CHANGE UP (ref 8.4–10.5)
CHLORIDE SERPL-SCNC: 104 MMOL/L — SIGNIFICANT CHANGE UP (ref 96–108)
CO2 SERPL-SCNC: 27 MMOL/L — SIGNIFICANT CHANGE UP (ref 22–31)
CREAT SERPL-MCNC: 0.73 MG/DL — SIGNIFICANT CHANGE UP (ref 0.5–1.3)
DACRYOCYTES BLD QL SMEAR: SLIGHT — SIGNIFICANT CHANGE UP
ELLIPTOCYTES BLD QL SMEAR: SIGNIFICANT CHANGE UP
EOSINOPHIL # BLD AUTO: 0.06 K/UL — SIGNIFICANT CHANGE UP (ref 0–0.5)
EOSINOPHIL NFR BLD AUTO: 0.9 % — SIGNIFICANT CHANGE UP (ref 0–6)
GIANT PLATELETS BLD QL SMEAR: PRESENT — SIGNIFICANT CHANGE UP
GLUCOSE SERPL-MCNC: 105 MG/DL — HIGH (ref 70–99)
HCT VFR BLD CALC: 34.6 % — SIGNIFICANT CHANGE UP (ref 34.5–45)
HGB BLD-MCNC: 10.8 G/DL — LOW (ref 11.5–15.5)
HYPOCHROMIA BLD QL: SIGNIFICANT CHANGE UP
LYMPHOCYTES # BLD AUTO: 1.68 K/UL — SIGNIFICANT CHANGE UP (ref 1–3.3)
LYMPHOCYTES # BLD AUTO: 26.3 % — SIGNIFICANT CHANGE UP (ref 13–44)
MANUAL SMEAR VERIFICATION: SIGNIFICANT CHANGE UP
MCHC RBC-ENTMCNC: 23.2 PG — LOW (ref 27–34)
MCHC RBC-ENTMCNC: 31.2 GM/DL — LOW (ref 32–36)
MCV RBC AUTO: 74.4 FL — LOW (ref 80–100)
MONOCYTES # BLD AUTO: 0.45 K/UL — SIGNIFICANT CHANGE UP (ref 0–0.9)
MONOCYTES NFR BLD AUTO: 7 % — SIGNIFICANT CHANGE UP (ref 2–14)
NEUTROPHILS # BLD AUTO: 4.08 K/UL — SIGNIFICANT CHANGE UP (ref 1.8–7.4)
NEUTROPHILS NFR BLD AUTO: 64 % — SIGNIFICANT CHANGE UP (ref 43–77)
PLAT MORPH BLD: ABNORMAL
PLATELET # BLD AUTO: 180 K/UL — SIGNIFICANT CHANGE UP (ref 150–400)
POIKILOCYTOSIS BLD QL AUTO: SIGNIFICANT CHANGE UP
POTASSIUM SERPL-MCNC: 3.7 MMOL/L — SIGNIFICANT CHANGE UP (ref 3.5–5.3)
POTASSIUM SERPL-SCNC: 3.7 MMOL/L — SIGNIFICANT CHANGE UP (ref 3.5–5.3)
PROT SERPL-MCNC: 7.2 G/DL — SIGNIFICANT CHANGE UP (ref 6–8.3)
RBC # BLD: 4.65 M/UL — SIGNIFICANT CHANGE UP (ref 3.8–5.2)
RBC # FLD: 15.8 % — HIGH (ref 10.3–14.5)
RBC BLD AUTO: ABNORMAL
SMUDGE CELLS # BLD: PRESENT — SIGNIFICANT CHANGE UP
SODIUM SERPL-SCNC: 141 MMOL/L — SIGNIFICANT CHANGE UP (ref 135–145)
TARGETS BLD QL SMEAR: SIGNIFICANT CHANGE UP
TROPONIN T, HIGH SENSITIVITY RESULT: <6 NG/L — SIGNIFICANT CHANGE UP (ref 0–51)
WBC # BLD: 6.38 K/UL — SIGNIFICANT CHANGE UP (ref 3.8–10.5)
WBC # FLD AUTO: 6.38 K/UL — SIGNIFICANT CHANGE UP (ref 3.8–10.5)

## 2021-08-02 PROCEDURE — 80053 COMPREHEN METABOLIC PANEL: CPT

## 2021-08-02 PROCEDURE — 71045 X-RAY EXAM CHEST 1 VIEW: CPT

## 2021-08-02 PROCEDURE — 99284 EMERGENCY DEPT VISIT MOD MDM: CPT | Mod: 25

## 2021-08-02 PROCEDURE — 93005 ELECTROCARDIOGRAM TRACING: CPT

## 2021-08-02 PROCEDURE — 84484 ASSAY OF TROPONIN QUANT: CPT

## 2021-08-02 PROCEDURE — 71045 X-RAY EXAM CHEST 1 VIEW: CPT | Mod: 26

## 2021-08-02 PROCEDURE — 85025 COMPLETE CBC W/AUTO DIFF WBC: CPT

## 2021-08-02 NOTE — ED PROVIDER NOTE - ATTENDING CONTRIBUTION TO CARE
71yr F hx os afib on apixaban, p/w lightheadedness. reports episodes earlier today, now resolved. no cp, sob, recurred while feeling the sx, checked BPs an HR and noted changes. denies recent illness, infectious sx. abd pain. change in intake.   exam reassuring including, neuro intact, no carotid bruits, no nystagmus, clear lungs, S1-2, soft abd, no peripheral edema.  plan for syncope work up, including trop ekg and cxr.  work up unremarkable. deemed not high risk syncope and asymptomatic, had a shared decision with the patient who felt confident can make appt with her own cardiologist in AM.

## 2021-08-02 NOTE — ED PROVIDER NOTE - PHYSICAL EXAMINATION
General appearance: NAD, conversant, afebrile    Neck: Trachea midline; Full range of motion, supple   Pulm: CTA bl, normal respiratory effort and no intercostal retractions, normal work of breathing   CV: RRR, No murmurs, rubs, or gallops   Abdomen: Soft, non-tender, non-distended; no guarding or rebound   Extremities: No peripheral edema   Skin: Dry, normal temperature, turgor and texture; no rash   Psych: Appropriate affect, cooperative; alert and oriented to person, place and time

## 2021-08-02 NOTE — ED PROVIDER NOTE - CLINICAL SUMMARY MEDICAL DECISION MAKING FREE TEXT BOX
72yo female with pmh afib on eliquis, osteoporosis, presenting with lightheadedness.  Sinus ashley on ekg.  Non ischemic and no chest pain, shortness of breath.  Will get labs, trop, cxr, and monitor on tele.  Reassess after results.

## 2021-08-02 NOTE — ED ADULT NURSE NOTE - NSIMPLEMENTINTERV_GEN_ALL_ED
Implemented All Fall with Harm Risk Interventions:  [place] to call system. Call bell, personal items and telephone within reach. Instruct patient to call for assistance. Room bathroom lighting operational. Non-slip footwear when patient is off stretcher. Physically safe environment: no spills, clutter or unnecessary equipment. Stretcher in lowest position, wheels locked, appropriate side rails in place. Provide visual cue, wrist band, yellow gown, etc. Monitor gait and stability. Monitor for mental status changes and reorient to person, place, and time. Review medications for side effects contributing to fall risk. Reinforce activity limits and safety measures with patient and family. Provide visual clues: red socks.

## 2021-08-02 NOTE — ED ADULT NURSE NOTE - OBJECTIVE STATEMENT
70 y/o female history of diabetes and Atrial fibrillation presents to the ED from home c/o chest pain.  Patient states that she has had chest pain today intermittently since 12 PM. patient states that she has been having a rapid heart rate and HTN with the chest pain. Patient also states that she has been having dizziness accompanying the pain. Patient states that she takes Ellaquis 5 mg for her A. Fib. Denies fever, chills, n/v, weakness, abd pain, diarrhea/constipation, numbness/tingling, urinary s/s. Patient A&Ox3, in no respiratory distress. Chest rise symmetrical. No retraction use when breathing. Strong peripheral pulses. A.Fib. on cardiac monitor.  at bedside. Patient instructed to call RN if anything is needed. Side rails up. Stretcher in lowest position.

## 2021-08-02 NOTE — ED PROVIDER NOTE - OBJECTIVE STATEMENT
72yo female with pmh afib on eliquis, osteoporosis, presenting with lightheadedness.  This afternoon felt like she was going to pass out while seated lasting about 2 seconds.  Took her bp which was high, hr 80s but felt sensation of palpitations.  No chest pain difficulty breathing.  No fevers, cough, n/v, abdominal pain.

## 2021-08-02 NOTE — ED PROVIDER NOTE - PATIENT PORTAL LINK FT
You can access the FollowMyHealth Patient Portal offered by [facility] by registering at the following website: [email or web address]. By joining [employer]’s FollowMyHealth portal, you will also be able to view your health information using other applications (apps) compatible with our system.

## 2021-08-02 NOTE — ED PROVIDER NOTE - NSFOLLOWUPINSTRUCTIONS_ED_ALL_ED_FT
Follow up with your cardiologist as soon as possible  Rest, drink plenty of fluids  Advance activity as tolerated  Continue all previously prescribed medications as directed  Follow up with your PMD - bring copies of your results  Return to the ER for chest pain, difficulty breathing, syncope, or other new or concerning symptoms

## 2021-09-15 ENCOUNTER — APPOINTMENT (OUTPATIENT)
Dept: ENDOCRINOLOGY | Facility: CLINIC | Age: 71
End: 2021-09-15
Payer: MEDICARE

## 2021-09-15 PROCEDURE — ZZZZZ: CPT

## 2021-09-27 ENCOUNTER — APPOINTMENT (OUTPATIENT)
Dept: ENDOCRINOLOGY | Facility: CLINIC | Age: 71
End: 2021-09-27
Payer: MEDICARE

## 2021-09-27 LAB
25(OH)D3 SERPL-MCNC: 41.3 NG/ML
ALBUMIN SERPL ELPH-MCNC: 4.2 G/DL
ALP BLD-CCNC: 52 U/L
ALT SERPL-CCNC: 11 U/L
ANION GAP SERPL CALC-SCNC: 10 MMOL/L
AST SERPL-CCNC: 17 U/L
BILIRUB SERPL-MCNC: 0.2 MG/DL
BUN SERPL-MCNC: 12 MG/DL
CALCIUM SERPL-MCNC: 9.8 MG/DL
CHLORIDE SERPL-SCNC: 104 MMOL/L
CO2 SERPL-SCNC: 28 MMOL/L
CREAT SERPL-MCNC: 0.83 MG/DL
GLUCOSE SERPL-MCNC: 87 MG/DL
POTASSIUM SERPL-SCNC: 4.4 MMOL/L
PROT SERPL-MCNC: 7.1 G/DL
SODIUM SERPL-SCNC: 142 MMOL/L

## 2021-09-27 PROCEDURE — 96401 CHEMO ANTI-NEOPL SQ/IM: CPT

## 2021-09-27 RX ORDER — DENOSUMAB 60 MG/ML
60 INJECTION SUBCUTANEOUS
Qty: 1 | Refills: 0 | Status: COMPLETED | OUTPATIENT
Start: 2021-09-27

## 2021-09-27 RX ADMIN — DENOSUMAB 0 MG/ML: 60 INJECTION SUBCUTANEOUS at 00:00

## 2022-01-06 ENCOUNTER — APPOINTMENT (OUTPATIENT)
Dept: OPHTHALMOLOGY | Facility: CLINIC | Age: 72
End: 2022-01-06

## 2022-01-24 ENCOUNTER — APPOINTMENT (OUTPATIENT)
Dept: OPHTHALMOLOGY | Facility: CLINIC | Age: 72
End: 2022-01-24
Payer: COMMERCIAL

## 2022-01-24 ENCOUNTER — NON-APPOINTMENT (OUTPATIENT)
Age: 72
End: 2022-01-24

## 2022-01-24 PROCEDURE — 92014 COMPRE OPH EXAM EST PT 1/>: CPT

## 2022-01-24 PROCEDURE — 92015 DETERMINE REFRACTIVE STATE: CPT

## 2022-02-11 ENCOUNTER — RX RENEWAL (OUTPATIENT)
Age: 72
End: 2022-02-11

## 2022-03-14 ENCOUNTER — APPOINTMENT (OUTPATIENT)
Dept: OPHTHALMOLOGY | Facility: CLINIC | Age: 72
End: 2022-03-14
Payer: MEDICARE

## 2022-03-14 ENCOUNTER — NON-APPOINTMENT (OUTPATIENT)
Age: 72
End: 2022-03-14

## 2022-03-14 PROCEDURE — 92012 INTRM OPH EXAM EST PATIENT: CPT

## 2022-03-22 ENCOUNTER — NON-APPOINTMENT (OUTPATIENT)
Age: 72
End: 2022-03-22

## 2022-03-22 ENCOUNTER — APPOINTMENT (OUTPATIENT)
Dept: OPHTHALMOLOGY | Facility: CLINIC | Age: 72
End: 2022-03-22
Payer: MEDICARE

## 2022-03-22 PROCEDURE — 92012 INTRM OPH EXAM EST PATIENT: CPT

## 2022-05-25 ENCOUNTER — APPOINTMENT (OUTPATIENT)
Dept: ENDOCRINOLOGY | Facility: CLINIC | Age: 72
End: 2022-05-25
Payer: MEDICARE

## 2022-05-25 VITALS
BODY MASS INDEX: 28.83 KG/M2 | HEART RATE: 52 BPM | DIASTOLIC BLOOD PRESSURE: 72 MMHG | WEIGHT: 143 LBS | OXYGEN SATURATION: 99 % | TEMPERATURE: 97.3 F | HEIGHT: 58.9 IN | SYSTOLIC BLOOD PRESSURE: 114 MMHG | RESPIRATION RATE: 17 BRPM

## 2022-05-25 PROCEDURE — 99214 OFFICE O/P EST MOD 30 MIN: CPT

## 2022-05-26 NOTE — PROCEDURE
[FreeTextEntry1] : Thyroid ultrasound May 25, 2022\par Left simple cyst 7 mm X 6 mm X 9 mm\par \par Bone mineral density 03/10/2021\par indication: vs. 2020 prior test showed bone loss\par spine -2.2 osteopenia, +7.7%\par total hip -1.2 osteopenia, no significant change\par femoral neck 14 osteopenia, +4.2%\par proximal radius 1.6 osteopenia, no significant change\par \par TUS 03/10/2021\par single left cyst 8 mm x 8 mm x 10 mm\par \par Bone mineral density: 02/21/2020 \par Indication: vs. 2019 prior test showed bone loss\par Spine: -2.8 osteoporosis -4.0%, suspect edge detection analysis change, no significant change vs. 2016\par Total hip: -2.2 osteopenia +7.0%\par Femoral neck: -1.6 osteopenia +7.6%\par Proximal radius: -1.5 osteopenia, no significant change\par \par Bone mineral density: 01/28/2019 \par Indication: vs 2016\par Spine: -2.5 osteoporosis (+3.4%, +6.1% vs 2015)\par Total hip: -1.5 osteopenia, no significant change \par Femoral neck: -1.9 osteopenia (-5.0%, stable vs 2015)\par Proximal radius: -1.3 osteopenia, no significant change (+4.8% vs 2015)\par \par Thyroid ultrasound December 19, 2016\par Left hypoechoic nodule 8 x 6 x 8 mm\par \par Bone mineral density December 19, 2016\par Indication assess response to medication\par Spine -2.8, osteoporosis, no significant change\par Femoral neck -1.7, osteopenia, no significant change\par Total hip -1.5, osteopenia, no significant change\par Proximal radius -1.4, osteopenia, no significant change\par \par \par thyroid ultrasound  Sept 18, 2105\par General texture normal. Single left hypoechoic nodule suggestive of thyroiditis 7 x 7 x 8 mm\par \par bone mineral density test performed October 28, 2015\par spine -3.0, osteoporosis\par Total hip -1.6, osteopenia\par Femoral neck -1.9, osteopenia\par Proximal radius -1.7, osteopenia

## 2022-05-26 NOTE — HISTORY OF PRESENT ILLNESS
[FreeTextEntry1] : No significant interval health changes.  No interval surgery, hospitalizations, fractures, or change in medications.\par \par H/o osteoporosis for many years. Took Fosamax for 7 years approximately from 2006 until 2013, stopped due to UGI sx. BMD Oct 2015 low. Began Prolia December 2015. Tolerating well. No thigh pain, no interval fx.\par Bone density 2021 improved.\par Status post recent left upper molar implant and recent right upper molar extraction approximately 2 weeks ago.\par \par H/o low TSH 0.35, and concern for subclinical hyperthyroidism. Pt remains asympt. She has had several TSH values been low or low normal. T4 has remained normal. Prior TSH minimally decreased at 0.37 but free T4 low normal at 1.1 total T3 95. March 2018 TSH: 0.71. Thyroid scan July 2014, normal scan, 18% uptake. PCP repeated a thyroid uptake and scan on 10/13/15 which showed normal thyroid scan and uptake of 29%; increased from previous 17.5%. no hot or cold nodules. No h/o thyroid disease, nodules, exposure to RT, fhx, exposure lithium or amiodarone.\par \par H/o trial fibrillation, on Eliquis.\par \par A1c stable at 6.1% mother DM. Pt is a vegetarian foods/vegetables. Pt limits rice to brown rice, whole wheat bread. No soda, no juice and natural juice. Patient has howard candy from time to time, no sweet tooth habits.\par \par Pt born in [place].  [Alendronate (Fosomax)] : Alendronate [Prolia (Denosumab)] : Prolia (Denosumab) [Premat. Menopause (surg)] : premature menopause which occurred surgically [Disordered Eating] : no past or present history of disordered eating [Taking Steroids] : no past or present history of taking steroids [Kidney Stones] : no history of kidney stones [Excessive Alcohol Intake] : no past or present history of excessive alcohol intake [Sedentary] : no past or present history of a sedentary lifestyle [Current Smoking___(ppd)] : not currently smoking [Family History of Osteoporosis] : no family history of osteoporosis [Family History of Breast Cancer] : no family history of breast cancer [Family History of Hip Fracture] : no family history of hip fracture [Hyperparathyroidism] : no hyperparathyroidism [Regular Dental Follow-Up] : regular dental follow-up [History of Radiation Therapy] : no history of radiation therapy [History of Blood Clots] : no history of blood clots [Previous Fragility Fracture] : no previous fragility fracture [de-identified] : Last dental visit 3 months ago

## 2022-05-26 NOTE — ASSESSMENT
[FreeTextEntry1] : 71 y/o female with:\par \par Osteoporosis: previously treated with Fosamax between 2006 to 2013, stopped due to UGI sx. Low BMD in Oct 2015. Began Prolia December 2015. Tolerating well. No thigh pain, no interval fx. No ONJ. BMD 12/2016 essentially stable. BMD 1/2019 indicates further improvement in lowest site, spine. All other sites essentially stable osteopenia. BMD 2/2020 indicates slightly worsened osteoporosis in spine although this maybe due to technical error, significantly improving osteopenia in hip, and stable osteopenia in proximal radius. BMD 3/2021 indicates improving osteoporosis in spine and femoral neck, stable osteopenia in total hip and proximal radius. BMD results reviewed w/ pt \par Patient had recent right upper molar tooth extraction.  Recommend delay Prolia for 4 weeks.\par \par H/o subclinical hyperthyroidism:  and thyroid nodule pt is clinically euthyroid on PE. Thyroid scan showed increased uptake of 29% from 17% from 2014 to 2015. Single subcentimeter L thyroid nodule stable. No local neck pain. No dysphagia or dysphonia. No raciness, shakiness, heat/cold intolerance, tiredness, or fatigue. No palpitations, tremors, or sudden weight gain/loss. TUS  c/w stable left nodule.\par \par Prediabetes: A1C stable 5.9%. Neg microalbumin. Obesity.  Natural history of prediabetes discussed in detail. Pt advised re: watching weight, maintaining moderate to low carbohydrate intake. Controversy concerning use of metformin for prediabetes reviewed. \par Labs May 2022 calcium normal 9.8, creatinine normal 0.75, hemoglobin A1c 5.9, TSH 0.3, free T4 normal 2.7\par Labs reviewed: Ca 9.3, normal. HgA1c 6.1%, prediabetes. TSH 0.44, normal.\par \par F/u in 6 months [Importance of Diet and Exercise] : importance of diet and exercise to improve glycemic control, achieve weight loss and improve cardiovascular health [Denosumab Therapy] : Risks  and benefits of denosumab therapy were discussed with the patient including eczema, cellulitis, osteonecrosis of the jaw and atypical femur fractures

## 2022-06-22 ENCOUNTER — APPOINTMENT (OUTPATIENT)
Dept: ENDOCRINOLOGY | Facility: CLINIC | Age: 72
End: 2022-06-22

## 2022-07-05 ENCOUNTER — APPOINTMENT (OUTPATIENT)
Dept: OTOLARYNGOLOGY | Facility: CLINIC | Age: 72
End: 2022-07-05

## 2022-07-05 VITALS
HEART RATE: 64 BPM | SYSTOLIC BLOOD PRESSURE: 105 MMHG | BODY MASS INDEX: 30.02 KG/M2 | WEIGHT: 143 LBS | HEIGHT: 58 IN | DIASTOLIC BLOOD PRESSURE: 67 MMHG

## 2022-07-05 DIAGNOSIS — J32.9 CHRONIC SINUSITIS, UNSPECIFIED: ICD-10-CM

## 2022-07-05 DIAGNOSIS — H92.09 OTALGIA, UNSPECIFIED EAR: ICD-10-CM

## 2022-07-05 DIAGNOSIS — H90.5 UNSPECIFIED SENSORINEURAL HEARING LOSS: ICD-10-CM

## 2022-07-05 DIAGNOSIS — H69.83 OTHER SPECIFIED DISORDERS OF EUSTACHIAN TUBE, BILATERAL: ICD-10-CM

## 2022-07-05 PROCEDURE — 92567 TYMPANOMETRY: CPT

## 2022-07-05 PROCEDURE — 92557 COMPREHENSIVE HEARING TEST: CPT

## 2022-07-05 PROCEDURE — 99214 OFFICE O/P EST MOD 30 MIN: CPT | Mod: 25

## 2022-07-05 PROCEDURE — 31231 NASAL ENDOSCOPY DX: CPT

## 2022-07-05 RX ORDER — DOXEPIN HYDROCHLORIDE 150 MG/1
150 CAPSULE ORAL
Qty: 388 | Refills: 0 | Status: COMPLETED | COMMUNITY
Start: 2022-03-30

## 2022-07-05 RX ORDER — AMOXICILLIN 500 MG/1
500 TABLET, FILM COATED ORAL
Qty: 21 | Refills: 0 | Status: COMPLETED | COMMUNITY
Start: 2022-06-17

## 2022-07-05 RX ORDER — AMOXICILLIN 500 MG/1
500 CAPSULE ORAL
Qty: 21 | Refills: 0 | Status: COMPLETED | COMMUNITY
Start: 2022-05-02

## 2022-07-05 RX ORDER — TOBRAMYCIN AND DEXAMETHASONE 3; 1 MG/ML; MG/ML
0.3-0.1 SUSPENSION/ DROPS OPHTHALMIC
Qty: 10 | Refills: 0 | Status: ACTIVE | COMMUNITY
Start: 2022-03-14

## 2022-07-05 RX ORDER — MELOXICAM 7.5 MG/1
7.5 TABLET ORAL
Qty: 30 | Refills: 0 | Status: ACTIVE | COMMUNITY
Start: 2022-04-14

## 2022-07-05 RX ORDER — DICLOFENAC SODIUM 1% 10 MG/G
1 GEL TOPICAL
Qty: 100 | Refills: 0 | Status: ACTIVE | COMMUNITY
Start: 2022-06-21

## 2022-07-05 RX ORDER — FLUTICASONE PROPIONATE 50 UG/1
50 SPRAY, METERED NASAL
Qty: 1 | Refills: 3 | Status: ACTIVE | COMMUNITY
Start: 2022-07-05 | End: 1900-01-01

## 2022-07-05 RX ORDER — AZELASTINE HYDROCHLORIDE 137 UG/1
0.1 SPRAY, METERED NASAL TWICE DAILY
Qty: 1 | Refills: 3 | Status: ACTIVE | COMMUNITY
Start: 2022-07-05 | End: 1900-01-01

## 2022-07-05 NOTE — HISTORY OF PRESENT ILLNESS
[de-identified] : 72 year old female presents for nasal congestion\par Previously seen by Dr Hand\par LCV with him was 11/19/2020 at which time was dx with TMJ\par Longstanding issue- intermittent nasal congestion, clear anterior rhinorrhea, PND, occasional facial pressure \par Sense of smell is decreased, fluctuates \par Denies epistaxis, recurrent sinus infections \par Uses Benadryl as needed\par No recent use of nasal sprays \par CT sinus from 2015-- reviewed personally-- R maxillary retention cyst, mild pansinus inflammation, L DNS\par \par For the past month- intermittent bilateral otalgia, tinnitus, mild dizziness\par No concerns for hearing loss\par Prior audio from 2020 with midl to moderate HF SNHL AU\par Denies ear infections or otorrhea \par \par PMH:  Osteoporosis, Afib (on eliquis)\par PSH: hand surgery, tooth extraction and bone implant, hysterectomy

## 2022-08-10 ENCOUNTER — APPOINTMENT (OUTPATIENT)
Dept: ENDOCRINOLOGY | Facility: CLINIC | Age: 72
End: 2022-08-10

## 2022-08-10 PROCEDURE — 96401 CHEMO ANTI-NEOPL SQ/IM: CPT

## 2022-08-10 RX ORDER — DENOSUMAB 60 MG/ML
60 INJECTION SUBCUTANEOUS
Qty: 1 | Refills: 0 | Status: COMPLETED | OUTPATIENT
Start: 2022-08-03

## 2022-08-15 LAB — 25(OH)D3 SERPL-MCNC: 45.6 NG/ML

## 2022-09-06 ENCOUNTER — APPOINTMENT (OUTPATIENT)
Dept: OTOLARYNGOLOGY | Facility: CLINIC | Age: 72
End: 2022-09-06

## 2022-09-22 ENCOUNTER — APPOINTMENT (OUTPATIENT)
Dept: OPHTHALMOLOGY | Facility: CLINIC | Age: 72
End: 2022-09-22

## 2023-02-15 ENCOUNTER — APPOINTMENT (OUTPATIENT)
Dept: ENDOCRINOLOGY | Facility: CLINIC | Age: 73
End: 2023-02-15
Payer: MEDICARE

## 2023-02-15 VITALS
OXYGEN SATURATION: 99 % | WEIGHT: 146 LBS | BODY MASS INDEX: 30.64 KG/M2 | DIASTOLIC BLOOD PRESSURE: 86 MMHG | HEIGHT: 58 IN | HEART RATE: 58 BPM | SYSTOLIC BLOOD PRESSURE: 124 MMHG

## 2023-02-15 DIAGNOSIS — E04.1 NONTOXIC SINGLE THYROID NODULE: ICD-10-CM

## 2023-02-15 PROCEDURE — 99214 OFFICE O/P EST MOD 30 MIN: CPT | Mod: 25

## 2023-02-15 PROCEDURE — ZZZZZ: CPT

## 2023-02-15 PROCEDURE — 96401 CHEMO ANTI-NEOPL SQ/IM: CPT

## 2023-02-15 PROCEDURE — 77080 DXA BONE DENSITY AXIAL: CPT

## 2023-02-15 RX ORDER — DENOSUMAB 60 MG/ML
60 INJECTION SUBCUTANEOUS
Qty: 1 | Refills: 0 | Status: COMPLETED | OUTPATIENT
Start: 2023-02-15

## 2023-02-15 RX ADMIN — DENOSUMAB 60 MG/ML: 60 INJECTION SUBCUTANEOUS at 00:00

## 2023-02-17 NOTE — HISTORY OF PRESENT ILLNESS
[Alendronate (Fosomax)] : Alendronate [Prolia (Denosumab)] : Prolia (Denosumab) [Premat. Menopause (surg)] : premature menopause which occurred surgically [Regular Dental Follow-Up] : regular dental follow-up [FreeTextEntry1] : Patient returns for a follow up visit for osteoporosis and hyperthyroidism.  Since the last visit pt has No significant interval health changes. No interval surgery, hospitalizations, fractures, or change in medications. \par \par Osteoporosis: H/o osteoporosis for many years. Took Fosamax for 7 years approximately from 2006 until 2013, stopped due to UGI sx. BMD Oct 2015 low. Began Prolia December 2015. Tolerating well. No thigh pain, no interval fx.\par Bone density 2021 improved.\par Status post recent left upper molar implant and recent right upper molar extraction approximately 2 weeks ago.\par \par Hyperthroidism: H/o low TSH 0.35, and concern for subclinical hyperthyroidism. Pt remains asympt. She has had several TSH values been low or low normal. T4 has remained normal. Prior TSH minimally decreased at 0.37 but free T4 low normal at 1.1 total T3 95. March 2018 TSH: 0.71. Thyroid scan July 2014, normal scan, 18% uptake. PCP repeated a thyroid uptake and scan on 10/13/15 which showed normal thyroid scan and uptake of 29%; increased from previous 17.5%. no hot or cold nodules. No h/o thyroid disease, nodules, exposure to RT, fhx, exposure lithium or amiodarone.\par \par H/o trial fibrillation, on Eliquis.\par \par Prediabetes: A1c stable at 5.8% mother DM. Pt is a vegetarian foods/vegetables. Pt limits rice to brown rice, whole wheat bread. No soda, no juice and natural juice. Patient has howard candy from time to time, no sweet tooth habits.\par \par Pt born in [place].  [Disordered Eating] : no past or present history of disordered eating [Taking Steroids] : no past or present history of taking steroids [Kidney Stones] : no history of kidney stones [Excessive Alcohol Intake] : no past or present history of excessive alcohol intake [Sedentary] : no past or present history of a sedentary lifestyle [Current Smoking___(ppd)] : not currently smoking [Family History of Osteoporosis] : no family history of osteoporosis [Family History of Breast Cancer] : no family history of breast cancer [Family History of Hip Fracture] : no family history of hip fracture [Hyperparathyroidism] : no hyperparathyroidism [History of Radiation Therapy] : no history of radiation therapy [History of Blood Clots] : no history of blood clots [Previous Fragility Fracture] : no previous fragility fracture [de-identified] : Last dental visit 3 months ago

## 2023-02-17 NOTE — PROCEDURE
[FreeTextEntry1] : Thyroid ultrasound February 15, 2023 \par Left simple cyst 7 mm X 7 mm X 8 mm\par \par Bone Density 2/15/2023\par Indication vs 2021\par Spine -2.5 osteoporosis -3.0% \par Total Hip -1.2 osteopenia , no significant change \par Femoral Neck -1.3 osteopenia , no significant change \par Proximal Radius -1.8 osteopenia , no significant change \par \par Thyroid ultrasound May 25, 2022\par Left simple cyst 7 mm X 6 mm X 9 mm\par \par Bone mineral density 03/10/2021\par indication: vs. 2020 prior test showed bone loss\par spine -2.2 osteopenia, +7.7%\par total hip -1.2 osteopenia, no significant change\par femoral neck 14 osteopenia, +4.2%\par proximal radius 1.6 osteopenia, no significant change\par \par TUS 03/10/2021\par single left cyst 8 mm x 8 mm x 10 mm\par \par Bone mineral density: 02/21/2020 \par Indication: vs. 2019 prior test showed bone loss\par Spine: -2.8 osteoporosis -4.0%, suspect edge detection analysis change, no significant change vs. 2016\par Total hip: -2.2 osteopenia +7.0%\par Femoral neck: -1.6 osteopenia +7.6%\par Proximal radius: -1.5 osteopenia, no significant change\par \par Bone mineral density: 01/28/2019 \par Indication: vs 2016\par Spine: -2.5 osteoporosis (+3.4%, +6.1% vs 2015)\par Total hip: -1.5 osteopenia, no significant change \par Femoral neck: -1.9 osteopenia (-5.0%, stable vs 2015)\par Proximal radius: -1.3 osteopenia, no significant change (+4.8% vs 2015)\par \par Thyroid ultrasound December 19, 2016\par Left hypoechoic nodule 8 x 6 x 8 mm\par \par Bone mineral density December 19, 2016\par Indication assess response to medication\par Spine -2.8, osteoporosis, no significant change\par Femoral neck -1.7, osteopenia, no significant change\par Total hip -1.5, osteopenia, no significant change\par Proximal radius -1.4, osteopenia, no significant change\par \par \par thyroid ultrasound  Sept 18, 2105\par General texture normal. Single left hypoechoic nodule suggestive of thyroiditis 7 x 7 x 8 mm\par \par bone mineral density test performed October 28, 2015\par spine -3.0, osteoporosis\par Total hip -1.6, osteopenia\par Femoral neck -1.9, osteopenia\par Proximal radius -1.7, osteopenia

## 2023-02-17 NOTE — ASSESSMENT
[Importance of Diet and Exercise] : importance of diet and exercise to improve glycemic control, achieve weight loss and improve cardiovascular health [Denosumab Therapy] : Risks  and benefits of denosumab therapy were discussed with the patient including eczema, cellulitis, osteonecrosis of the jaw and atypical femur fractures [FreeTextEntry1] : 73 y/o female with:\par \par Osteoporosis: previously treated with Fosamax between 2006 to 2013, stopped due to UGI sx. Low BMD in Oct 2015. Began Prolia December 2015. Tolerating well. No thigh pain, no interval fx. No ONJ. BMD 12/2016 essentially stable. BMD 1/2019 indicates further improvement in lowest site, spine. All other sites essentially stable osteopenia. BMD 2/2020 indicates slightly worsened osteoporosis in spine although this maybe due to technical error, significantly improving osteopenia in hip, and stable osteopenia in proximal radius. BMD 3/2021 indicates improving osteoporosis in spine and femoral neck, stable osteopenia in total hip and proximal radius. BMD 2/2023 shows that there was a slight decrease in bone density in the spine. All other sites had no significant change. BMD results reviewed w/ pt Continue Prol[facility] from [facility]\par \par \par H/o subclinical hyperthyroidism:  and thyroid nodule pt is clinically euthyroid on PE. Thyroid scan showed increased uptake of 29% from 17% from 2014 to 2015. Single subcentimeter L thyroid nodule stable. No local neck pain. No dysphagia or dysphonia. No raciness, shakiness, heat/cold intolerance, tiredness, or fatigue. No palpitations, tremors, or sudden weight gain/loss. TUS  c/w stable left nodule.\par \par Prediabetes: A1C stable 5.8 %. Neg microalbumin. Obesity.  Natural history of prediabetes discussed in detail. Pt advised re: watching weight, maintaining moderate to low carbohydrate intake. Controversy concerning use of metformin for prediabetes reviewed. \par \par \par Labs reviewed:  \par Calcium 9.5\par Creatinine 0.73\par TSH 0.51 \par \par F/u in 6 months

## 2023-02-17 NOTE — END OF VISIT
[FreeTextEntry3] : This note was written by Sydney Baugh on ( February 15, 2023 ) acting as a medical scribe for Dr. Bautista This note was authored by the medical scribe for me. I have reviewed, edited, and revised the note as needed. I am in agreement with the exam findings, imaging findings, and treatment plan.  Gerhard Bautista MD

## 2023-05-17 ENCOUNTER — APPOINTMENT (OUTPATIENT)
Dept: OPHTHALMOLOGY | Facility: CLINIC | Age: 73
End: 2023-05-17
Payer: MEDICARE

## 2023-05-17 ENCOUNTER — NON-APPOINTMENT (OUTPATIENT)
Age: 73
End: 2023-05-17

## 2023-05-17 PROCEDURE — 92014 COMPRE OPH EXAM EST PT 1/>: CPT

## 2023-05-17 PROCEDURE — 92015 DETERMINE REFRACTIVE STATE: CPT

## 2023-07-26 ENCOUNTER — NON-APPOINTMENT (OUTPATIENT)
Age: 73
End: 2023-07-26

## 2023-07-28 ENCOUNTER — APPOINTMENT (OUTPATIENT)
Dept: DERMATOLOGY | Facility: CLINIC | Age: 73
End: 2023-07-28
Payer: MEDICARE

## 2023-07-28 VITALS — BODY MASS INDEX: 27.38 KG/M2 | HEIGHT: 61 IN | WEIGHT: 145 LBS

## 2023-07-28 PROCEDURE — 99203 OFFICE O/P NEW LOW 30 MIN: CPT

## 2023-07-31 ENCOUNTER — APPOINTMENT (OUTPATIENT)
Dept: WOUND CARE | Facility: CLINIC | Age: 73
End: 2023-07-31
Payer: MEDICARE

## 2023-07-31 ENCOUNTER — OUTPATIENT (OUTPATIENT)
Dept: OUTPATIENT SERVICES | Facility: HOSPITAL | Age: 73
LOS: 1 days | End: 2023-07-31
Payer: MEDICARE
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
## 2023-07-31 DIAGNOSIS — Z90.710 ACQUIRED ABSENCE OF BOTH CERVIX AND UTERUS: Chronic | ICD-10-CM
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
## 2023-07-31 DIAGNOSIS — R73.03 PREDIABETES.: ICD-10-CM

## 2023-07-31 PROCEDURE — G0463: CPT

## 2023-07-31 PROCEDURE — 99213 OFFICE O/P EST LOW 20 MIN: CPT

## 2023-07-31 RX ORDER — SILVER SULFADIAZINE 10 MG/G
1 CREAM TOPICAL
Qty: 50 | Refills: 8 | Status: ACTIVE | COMMUNITY
Start: 2023-07-31 | End: 1900-01-01

## 2023-08-04 PROBLEM — R73.03 PREDIABETES: Status: ACTIVE | Noted: 2020-02-21

## 2023-08-04 NOTE — PHYSICAL EXAM
[Normal Breath Sounds] : Normal breath sounds [Normal Heart Sounds] : normal heart sounds [No HSM] : no hepatosplenomegaly [Skin Ulcer] : ulcer [Alert] : alert [Oriented to Person] : oriented to person [Oriented to Place] : oriented to place [Oriented to Time] : oriented to time [Calm] : calm [Please See PDF for Tissue Analytics] : Please See PDF for [employer]. [JVD] : no jugular venous distention  [Abdomen Masses] : No abdominal massess [Abdomen Tenderness] : ~T ~M No abdominal tenderness [de-identified] : NAD, overweight [de-identified] : non traumatic [de-identified] : supple [de-identified] : equal chest rise [de-identified] : afib on eliquis [de-identified] : soft non tender

## 2023-08-04 NOTE — REVIEW OF SYSTEMS
[As Noted in HPI] : as noted in HPI [Skin Wound] : skin wound [Negative] : Heme/Lymph [FreeTextEntry5] : afib on eliquis
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
## 2023-08-06 RX ORDER — DENOSUMAB 60 MG/ML
60 INJECTION SUBCUTANEOUS
Qty: 1 | Refills: 1 | Status: ACTIVE | COMMUNITY
Start: 2019-07-23

## 2023-08-08 ENCOUNTER — OUTPATIENT (OUTPATIENT)
Dept: OUTPATIENT SERVICES | Facility: HOSPITAL | Age: 73
LOS: 1 days | End: 2023-08-08

## 2023-08-08 ENCOUNTER — APPOINTMENT (OUTPATIENT)
Dept: WOUND CARE | Facility: CLINIC | Age: 73
End: 2023-08-08
Payer: MEDICARE

## 2023-08-08 DIAGNOSIS — Z90.710 ACQUIRED ABSENCE OF BOTH CERVIX AND UTERUS: Chronic | ICD-10-CM

## 2023-08-08 PROCEDURE — 99442: CPT

## 2023-08-08 NOTE — PHYSICAL EXAM
[Skin Ulcer] : ulcer [Alert] : alert [Oriented to Person] : oriented to person [Oriented to Place] : oriented to place [Oriented to Time] : oriented to time [Calm] : calm [de-identified] : NAD, overweight [de-identified] : non traumatic [Please See PDF for [employer] Analytics] : Please See PDF for [employer].
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
## 2023-08-08 NOTE — REVIEW OF SYSTEMS
[As Noted in HPI] : as noted in HPI [Skin Wound] : skin wound [Negative] : Psychiatric [FreeTextEntry5] : afib on eliquis

## 2023-08-08 NOTE — PLAN
[FreeTextEntry1] : 8-8-23: Plan: cont to wash with soap and water silvadene/adaptic /abd/stockinette to area that is open/denuded only suncreen to healed areas daily when exposed to outside. f/u 1-2 weeks
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
## 2023-08-09 DIAGNOSIS — T30.0 BURN OF UNSPECIFIED BODY REGION, UNSPECIFIED DEGREE: ICD-10-CM

## 2023-08-09 DIAGNOSIS — R73.03 PREDIABETES: ICD-10-CM

## 2023-08-17 ENCOUNTER — APPOINTMENT (OUTPATIENT)
Dept: WOUND CARE | Facility: CLINIC | Age: 73
End: 2023-08-17
Payer: MEDICARE

## 2023-08-17 ENCOUNTER — OUTPATIENT (OUTPATIENT)
Dept: OUTPATIENT SERVICES | Facility: HOSPITAL | Age: 73
LOS: 1 days | End: 2023-08-17
Payer: MEDICARE

## 2023-08-17 DIAGNOSIS — Z87.828 PERSONAL HISTORY OF OTHER (HEALED) PHYSICAL INJURY AND TRAUMA: ICD-10-CM

## 2023-08-17 DIAGNOSIS — Z90.710 ACQUIRED ABSENCE OF BOTH CERVIX AND UTERUS: Chronic | ICD-10-CM

## 2023-08-17 PROCEDURE — 99213 OFFICE O/P EST LOW 20 MIN: CPT | Mod: 95

## 2023-08-17 PROCEDURE — G0463: CPT

## 2023-08-17 NOTE — PHYSICAL EXAM
[Alert] : alert [Oriented to Person] : oriented to person [Oriented to Place] : oriented to place [Oriented to Time] : oriented to time [Calm] : calm [Please See PDF for Tissue Analytics] : Please See PDF for [employer]. [de-identified] : NAD, overweight [de-identified] : non traumatic [de-identified] : equal chest rise [de-identified] : left thigh wound healed
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
## 2023-08-17 NOTE — PHYSICAL EXAM
Physical Therapy Evaluation    Visit Type: Initial Evaluation  Visit: 1  Referring Provider: Kim Ayala PA-C  Medical Diagnosis (from order): Diagnosis Information    Diagnosis  E888.9 (ICD-9-CM) - W19.XXXA (ICD-10-CM) - Fall, initial encounter       Treatment Diagnosis: LLE, RLE - increased pain/symptoms, impaired posture, impaired range of motion, impaired strength, impaired activity tolerance, impaired body mechanics, impaired balance, increased risk for falls and impaired gait.  Onset  - Date of onset: 8/8/2023  Chart reviewed at time of initial evaluation (relevant co-morbidities, allergies, tests and medications listed):   COPD (chronic obstructive pulmonary disease) (CMD)     Essential hypertension 2019    Heel fracture     Hemorrhagic stroke (CMD) 10/2019    Hyperlipidemia     S/P mediastinoscopy lymph node biopsy 5/22/2023    Stroke (CMD)           SUBJECTIVE                                                                                                               He states he had a fall on 8/08/2023 and is having pain in the ribs and more particularly on the left side with a broken or fractured rib there. Pt reports he does not remember much about the circumstances of his fall. He did have some pain in the left thumb an thinks it was likely from the fall but this is improving.     Pain / Symptoms  - Pain rating (out of 10): Current: 2 ; Best: 0; Worst: 6  - Location: Bilateral lower flanks   - Quality / Description: ache, sore, discomfort  - Alleviating Factors: rest, avoiding movement in involved area  - Progression since onset: improved    Function:   Limitations / Exacerbation Factors:   - Patient reports pain, difficulty and increased time with function reported below.  - , grocery shopping, house/yard work, bending/squatting/lifting, standing and walking, positional transitions, stairs, walking quickly as required to cross a street/exit a building rapidly, community distances  Prior Level of  Function: declining function, therefore referred to therapy. pain free ADLs and IADLs,    Patient Goals: decreased pain, increased motion, increased strength, independence with ADLs/IADLs and return to sport/leisure activities.    Prior treatment  - no therapies  - Discharged from hospital, home health, or skilled nursing facility in last 30 days: no  Home Environment   - Patient lives with: significant other  - Assistance available: as needed  - Reported 2 or more falls or an unexplained fall with injury in the last year.  - patient currently being seen in PT  - Feel safe at home / work / school: yes      OBJECTIVE                                                                                                                     Range of Motion (ROM)   (degrees unless noted; active unless noted; norms in ( ); negative=lacking to 0, positive=beyond 0)  WFL: LLE, RLE    Strength  (out of 5 unless noted, standard test position unless noted)   WFL: LLE, RLE, except as noted  Hip:    - Flexion:        • Left: 4-        • Right: 4-    - Extension:        • Left: 4-        • Right: 4-    - Abduction:        • Left: 3+        • Right: 3+    - Internal Rotation:        • Left: 4        • Right: 4    - External Rotation:        • Left: 4        • Right: 4             Standing Balance  (NATHAN = base of support)  Firm Surface: Single Leg     - Left, Eyes Open (sec): 2     - Left, Eyes Open details: stand by assist     - Right, Eyes Open (sec): 5     - Right, Eyes Open details: stand by assist    Balance Tests   5 Times Sit to Stand 11 sec    Norms: 70-79 year old - 4.5 -15.5 sec    Interpretation:        > 12 seconds indicates need for further assessment for fall risk for community dwelling elderly      > 16 seconds indicative of falls risk for Parkinson's disease  Dynamic Gait Index (DGI):   Dynamic Gait Index Score: 20 /24  See flowsheet for complete test.               Treatment     Therapeutic Activity  PT demonstrated HEP  exercises to pt who demonstrated back to PT to check for proper form.      Skilled input: verbal instruction/cues, tactile instruction/cues, posture correction and facilitation    Writer verbally educated and received verbal consent for hand placement, positioning of patient, and techniques to be performed today from patient for therapist position for techniques and hand placement and palpation for techniques as described above and how they are pertinent to the patient's plan of care.  Home Exercise Program  Access Code: [email or web address]  URL: [email or web address]  Date: 08/17/2023  Prepared by: Milton De Jesus    Exercises  - Standing Single Leg Stance with Counter Support  - 2 x daily - 7 x weekly - 2 reps - 30 sec hold  - Sit to Stand with Arms Crossed  - 2 x daily - 7 x weekly - 2 sets - 10 reps  - Standing Hip Abduction with Resistance at Ankles and Counter Support  - 2 x daily - 7 x weekly - 2 sets - 10 reps        ASSESSMENT                                                                                                          77 year old patient has reported functional limitations listed above impacted by signs and symptoms consistent with treatment diagnosis below.  Treatment Diagnosis:   - Involved: LLE, RLE.  - Symptoms/impairments: increased pain/symptoms, impaired posture, impaired range of motion, impaired strength, impaired activity tolerance, impaired body mechanics, impaired balance, increased risk for falls and impaired gait.    Prognosis: patient will benefit from skilled therapy  Rehabilitative potential is: good.  Predicted patient presentation: Moderate (evolving) - Patient comorbidities and complexities, as defined above, may have varying impact on steady progress for prescribed plan of care.  Education:   - Present and ready to learn: patient  - Results of above outlined education: Verbalizes understanding and Demonstrates understanding    PLAN                                                                                                                          The following skilled interventions to be implemented to achieve goals listed below:  Neuromuscular Re-Education (18077)  Therapeutic Activity (47145)  Therapeutic Exercise (49709)  Manual Therapy (05581)  Gait Training (18886)    Frequency / Duration  2 times per week tapering as patient progresses for 12 weeks for an estimated total of 12 visits    Patient involved in and agreed to plan of care and goals.  Patient given attendance policy at time of initial evaluation.    Suggestions for next session as indicated: Progress per plan of care      Goals  Long Term Goals: to be met by end of plan of care  1. Patient will demonstrate ability to negotiate level and unlevel surfaces at variable velocities, including change of direction without increased pain or instability to return to age appropriate and community activities at prior level of function.  2. Patient will ascend and descend 1 flight of steps, using reciprocal pattern, without eccentric drop, with stable knee foot alignment, with efficient eccentric control and with minimal difficulty for travel in home and community   3. Patient will complete single leg stance for 10 seconds without loss of balance for safe lower body dressing   4. Patient will be independent with progressed and modified home exercise program.      Therapy procedure time and total treatment time can be found documented on the Time Entry flowsheet     [Alert] : alert [Well Nourished] : well nourished [No Acute Distress] : no acute distress [Well Developed] : well developed [Normal Sclera/Conjunctiva] : normal sclera/conjunctiva [EOMI] : extra ocular movement intact [No Proptosis] : no proptosis [Thyroid Not Enlarged] : the thyroid was not enlarged [No Thyroid Nodules] : no palpable thyroid nodules [Clear to Auscultation] : lungs were clear to auscultation bilaterally [Normal S1, S2] : normal S1 and S2 [Normal Rate] : heart rate was normal [Regular Rhythm] : with a regular rhythm [No Edema] : no peripheral edema [Normal Bowel Sounds] : normal bowel sounds [Not Tender] : non-tender [Not Distended] : not distended [Soft] : abdomen soft [Normal Anterior Cervical Nodes] : no anterior cervical lymphadenopathy [No Spinal Tenderness] : no spinal tenderness [Spine Straight] : spine straight [No Stigmata of Cushings Syndrome] : no stigmata of Cushings Syndrome [Normal Gait] : normal gait [Normal Reflexes] : deep tendon reflexes were 2+ and symmetric [No Tremors] : no tremors [Oriented x3] : oriented to person, place, and time [de-identified] : silas madibularis, missing lower molars

## 2023-08-17 NOTE — PLAN
[FreeTextEntry1] : 8-8-23: Plan: cont to wash with soap and water silvadene/adaptic /abd/stockinette to area that is open/denuded only suncreen to healed areas daily when exposed to outside. f/u 1-2 weeks   8-17-23: Plan: wash with soap and water moisturize daily sunscreen daily Pt advised they can follow up as needed but pt would like to keep her in office appt for 8-23-23 to confirm wound is healed in person
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
## 2023-08-22 NOTE — ED ADULT NURSE REASSESSMENT NOTE - NS ED NURSE REASSESS COMMENT FT1
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 Pt reports she is continuing to improve with less coughing since starting claritin.   Pain / Symptoms:  - patient reports pain is not an issue/concern      OBJECTIVE                                                                                                                                Voice/Vocal Cord Dysfunction   Related History and Subjective Report  - Symptoms include: coughing, shortness of breath, dysphonia, difficulty drawing in a deep breath of air and neck/shoulder tension  - Contributing Medical History: possible allergies  - Symptom triggers include: talking and stress  - Patient-reported use of voice:       • Voice use: excessive      • Voice misuse/abuse:          - Water intake: quantity (per day unless noted) pt reports \"drinking more than enough of water\"          - Caffeine: quantity: 1 cup, type: coffee tea  - Patient denies trouble swallowing.  Dr Mancuso wrote on order \"vocal folds are white with no lesions and full mobility but hyperfunction seen and suspect glottic gap when breathy.\"   Objective  - Breathing patterns: mixed diaphragmatic/chest  - Perceptual vocal quality during connected speech: breathy, strain and decreased quality at end of air stream  - Resonance: within normal limits  - Voice onset time: within normal limits  -  Pt received on stretcher in 3A. Pt is awake, ambulatory, A&Ox4, NAD observed, respirations even and unlabored on room air. Bed assignment received, report given to receiving RN, awaiting transport. Vocal Cord Dysfunction/Paradoxical Vocal Fold Movement:      - Strategies/Techniques:         • Found effective in therapy: breathing techniques and relaxation           • Patient reported effective: breathing techniques and relaxation  Treatment  - Breath Support/Diagphragmatic Breathing: Completed for 5 minutes      Pt completed respiration activities focusing on h single words, multisyllabic words, and sentences. Pt required min-moderate verbal cues in beginning to model proper airflow. Pt did this with 100% intelligibility given min-moderate verbal cues overall. After verbal and visual cues were provided; pt was able to utilize strategies independently.                   ASSESSMENT                                                                                                           Patient presents to speech therapy below prior level of function in voice.  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  Voice therapy is recommended  to improve vocal quality for everyday functioning at home, with family, and out in the community.    Education:   - Present and ready to learn: patient  - Results of above outlined education: Verbalizes understanding and Demonstrates understanding    GOALS                                                                                                                           Short Term Goals:   1. Pt will participate in breath support exercises in order to improve pt's respirations during phonation given visual/verbal models with 100% accuracy  2. Pt will participate in pitch exercises given clinician modeling with 100% accuracy independently.   3. Use techniques to improve vocal hygiene and maximize functioning without vocal hyperfunction/ abuse with 100% frequency.  4. Pt will complete her home program x5 days  a week.  5. Pt will complete cervical exercises given visuals x10 reps each.  6. Pt will complete resonance exercises given visual/verbal models with 100% accuracy     Timeframe: 6 weeks   Long Term Goals: to be met by end of plan of care  1. 1. Patient will improve vocal quality for everyday functioning at home, with family, and out in the community.      Therapy procedure time and total treatment time can be found documented on the Time Entry flowsheet

## 2023-08-23 ENCOUNTER — APPOINTMENT (OUTPATIENT)
Dept: WOUND CARE | Facility: CLINIC | Age: 73
End: 2023-08-23
Payer: MEDICARE

## 2023-08-23 VITALS
TEMPERATURE: 97.9 F | WEIGHT: 145 LBS | RESPIRATION RATE: 18 BRPM | BODY MASS INDEX: 27.38 KG/M2 | DIASTOLIC BLOOD PRESSURE: 71 MMHG | HEART RATE: 77 BPM | HEIGHT: 61 IN | SYSTOLIC BLOOD PRESSURE: 107 MMHG | OXYGEN SATURATION: 98 %

## 2023-08-23 DIAGNOSIS — T30.0 BURN OF UNSPECIFIED BODY REGION, UNSPECIFIED DEGREE: ICD-10-CM

## 2023-08-23 DIAGNOSIS — S71.102D UNSPECIFIED OPEN WOUND, LEFT THIGH, SUBSEQUENT ENCOUNTER: ICD-10-CM
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
## 2023-08-23 DIAGNOSIS — Z87.828 PERSONAL HISTORY OF OTHER (HEALED) PHYSICAL INJURY AND TRAUMA: ICD-10-CM

## 2023-08-23 PROCEDURE — 99213 OFFICE O/P EST LOW 20 MIN: CPT
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
## 2023-08-23 NOTE — PLAN
[FreeTextEntry1] : 8-8-23: Plan: cont to wash with soap and water silvadene/adaptic /abd/stockinette to area that is open/denuded only suncreen to healed areas daily when exposed to outside. f/u 1-2 weeks   8-17-23: Plan: wash with soap and water moisturize daily sunscreen daily Pt advised they can follow up as needed but pt would like to keep her in office appt for 8-23-23 to confirm wound is healed in person   8/23/23 Wound is healed. Encouraged moisturizer and sunscreen to prevent hyperpigmentation No need for further follow up

## 2023-09-07 DIAGNOSIS — S71.102D UNSPECIFIED OPEN WOUND, LEFT THIGH, SUBSEQUENT ENCOUNTER: ICD-10-CM

## 2023-09-07 DIAGNOSIS — T30.0 BURN OF UNSPECIFIED BODY REGION, UNSPECIFIED DEGREE: ICD-10-CM

## 2023-09-13 DIAGNOSIS — Z87.828 PERSONAL HISTORY OF OTHER (HEALED) PHYSICAL INJURY AND TRAUMA: ICD-10-CM

## 2023-09-13 DIAGNOSIS — S71.102D UNSPECIFIED OPEN WOUND, LEFT THIGH, SUBSEQUENT ENCOUNTER: ICD-10-CM

## 2023-09-13 DIAGNOSIS — T30.0 BURN OF UNSPECIFIED BODY REGION, UNSPECIFIED DEGREE: ICD-10-CM

## 2023-09-15 ENCOUNTER — APPOINTMENT (OUTPATIENT)
Dept: ENDOCRINOLOGY | Facility: CLINIC | Age: 73
End: 2023-09-15
Payer: MEDICARE

## 2023-09-15 DIAGNOSIS — M81.0 AGE-RELATED OSTEOPOROSIS W/OUT CURRENT PATHOLOGICAL FRACTURE: ICD-10-CM

## 2023-09-15 PROCEDURE — 96401 CHEMO ANTI-NEOPL SQ/IM: CPT

## 2023-09-15 RX ORDER — DENOSUMAB 60 MG/ML
60 INJECTION SUBCUTANEOUS
Qty: 1 | Refills: 0 | Status: COMPLETED | OUTPATIENT
Start: 2023-09-12

## 2023-10-23 ENCOUNTER — APPOINTMENT (OUTPATIENT)
Dept: OPHTHALMOLOGY | Facility: CLINIC | Age: 73
End: 2023-10-23
Payer: MEDICARE

## 2023-10-23 ENCOUNTER — NON-APPOINTMENT (OUTPATIENT)
Age: 73
End: 2023-10-23

## 2023-10-23 PROCEDURE — 92012 INTRM OPH EXAM EST PATIENT: CPT

## 2023-11-08 ENCOUNTER — APPOINTMENT (OUTPATIENT)
Dept: OPHTHALMOLOGY | Facility: CLINIC | Age: 73
End: 2023-11-08

## 2024-03-22 ENCOUNTER — APPOINTMENT (OUTPATIENT)
Dept: ENDOCRINOLOGY | Facility: CLINIC | Age: 74
End: 2024-03-22

## 2024-05-16 NOTE — ED PROVIDER NOTE - CROS ED CARDIOVAS ALL NEG
proximal RCA.    Recent medication changes 1/15/24: started Erleada (decr INR). Decreased 240 mg to 180 mg (3/14)  2/16/24: started colchicine and MDP (incr INR)  Lupron inj q3 months, last in April   Medications taken regularly that may interact with warfarin or alter INR MVI (may contain vit K)  ASA (stent placement 10/30/17)  4-6 APAP/day prn knee pain   Warfarin dose taken as prescribed 5/16/24: Has been taking 7.5 mg daily; uses pillbox  held warfarin 11/24-11/28/23 and bridged with Lovenox    Signs/symptoms of bleeding H/o hematuria- patient states he was told by his urologist that as long as he is taking warfarin, hematuria may continue.  H/o epistaxis, occasional hemorrhoids, anemia.   Vitamin K intake 3/28: has 3-4 servings of his leafy greens/salads  3/21: Had ~2 servings of salads this week  3/14: pt reports to not eating as much greens bc of loss in appetite from the side effects of the cancer medication.  Normally has broccoli, asparagus, kale/gay salads 3-4 per week    Recent vomiting/diarrhea/fever, changes in weight or activity level Trying to improve diet/exercise and lose weight gradually.  2/22/24: Son pass recently from seizure/MS   Tobacco or alcohol use Patient denies tobacco use  Will have 1 glass of wine per day max   Upcoming surgeries or procedures 4/5: Dr. Pineda follow up for cancer/med.  prostate marker placement cancelled  TKR TBD     Assessment/Plan:   Patient's INR was therapeutic (2.3) today despite taking higher dose than precribed.   Pt was dx with prostate cancer. His prostate procedure was cancelled and he started Erleada instead on 1/15/24, which can decrease INR. He is scheduled to see [facility] tomorrow and was instructed to notify the clinic asap if he changes his erleada dose or stops his therapy, as this will require a warfarin dose decrease and close INR monitoring.     Patient does endorse being taken off of all BP meds (metoprolol, furosemide, triamterine/hctz, irbesartan) 
- - -

## 2024-12-01 NOTE — ED PROVIDER NOTE - PMH
Cristina Rivera is a 78 y.o. female on day 2 of admission presenting with Cerebral aneurysm ([facility]).    Subjective   No acute events overnight. Doing well this AM       Objective     Physical Exam  Aox3  both eyes 3R, EOMI  Face symmetric  Fcx4 5/5  SILT  Incision c/d/i    Last Recorded Vitals  Blood pressure 96/53, pulse 66, temperature 36.5 °C (97.7 °F), temperature source Temporal, resp. rate 12, weight (!) 44.6 kg (98 lb 5.2 oz), SpO2 94%.  Intake/Output last 3 Shifts:  I/O last 3 completed shifts:  In: 2729.2 (61.2 mL/kg) [I.V.:1319.2 (29.6 mL/kg); IV Piggyback:1410]  Out: 4335 (97.2 mL/kg) [Urine:3900 (2.4 mL/kg/hr); Drains:335; Blood:100]  Weight: 44.6 kg     Relevant Results                 Results for orders placed or performed during the hospital encounter of 11/29/24 (from the past 24 hours)   Coagulation Screen   Result Value Ref Range    Protime 13.6 (H) 9.8 - 12.8 seconds    INR 1.2 (H) 0.9 - 1.1    aPTT 29 27 - 38 seconds   Renal Function Panel   Result Value Ref Range    Glucose 89 74 - 99 mg/dL    Sodium 141 136 - 145 mmol/L    Potassium 3.6 3.5 - 5.3 mmol/L    Chloride 112 (H) 98 - 107 mmol/L    Bicarbonate 24 21 - 32 mmol/L    Anion Gap 9 (L) 10 - 20 mmol/L    Urea Nitrogen 6 6 - 23 mg/dL    Creatinine 0.42 (L) 0.50 - 1.05 mg/dL    eGFR >90 >60 mL/min/1.73m*2    Calcium 8.4 (L) 8.6 - 10.6 mg/dL    Phosphorus 2.6 2.5 - 4.9 mg/dL    Albumin 3.2 (L) 3.4 - 5.0 g/dL   CBC   Result Value Ref Range    WBC 11.3 4.4 - 11.3 x10*3/uL    nRBC 0.0 0.0 - 0.0 /100 WBCs    RBC 3.41 (L) 4.00 - 5.20 x10*6/uL    Hemoglobin 10.3 (L) 12.0 - 16.0 g/dL    Hematocrit 31.3 (L) 36.0 - 46.0 %    MCV 92 80 - 100 fL    MCH 30.2 26.0 - 34.0 pg    MCHC 32.9 32.0 - 36.0 g/dL    RDW 13.7 11.5 - 14.5 %    Platelets 234 150 - 450 x10*3/uL   Magnesium   Result Value Ref Range    Magnesium 1.91 1.60 - 2.40 mg/dL                    Assessment/Plan   Assessment & Plan  Cerebral aneurysm ([facility])    Asthma    Gastroesophageal reflux  disease    Cristina Perez is a 78 yr old F with H/o HTN, HLD, GERD, aortic stenosis, lymphocytic colitis, hyperparaT, L ophthalmic aneurysm s/p coil (2002, OSH), s/p L ICA coiling (2002, OSH), p/w newly found acomm aneurysm, 11/29 s/p R crani for acomm aneurysm clipping     Plan:  HEAVEN, tele this AM  SGD (off-suction),will likely dc this AM pending OP   -160  IO (euvolemic)  PTOT      Karine Cleveland MD       Borderline diabetes